# Patient Record
Sex: MALE | Race: WHITE | NOT HISPANIC OR LATINO | Employment: OTHER | URBAN - METROPOLITAN AREA
[De-identification: names, ages, dates, MRNs, and addresses within clinical notes are randomized per-mention and may not be internally consistent; named-entity substitution may affect disease eponyms.]

---

## 2017-10-20 ENCOUNTER — ALLSCRIPTS OFFICE VISIT (OUTPATIENT)
Dept: OTHER | Facility: OTHER | Age: 72
End: 2017-10-20

## 2017-10-20 ENCOUNTER — LAB REQUISITION (OUTPATIENT)
Dept: LAB | Facility: HOSPITAL | Age: 72
End: 2017-10-20
Payer: MEDICARE

## 2017-10-20 DIAGNOSIS — R31.9 HEMATURIA: ICD-10-CM

## 2017-10-20 LAB
BILIRUB UR QL STRIP: NORMAL
CLARITY UR: NORMAL
COLOR UR: YELLOW
GLUCOSE (HISTORICAL): NORMAL
HGB UR QL STRIP.AUTO: 50
KETONES UR STRIP-MCNC: NORMAL MG/DL
LEUKOCYTE ESTERASE UR QL STRIP: NORMAL
NITRITE UR QL STRIP: NORMAL
PH UR STRIP.AUTO: 6 [PH]
PROSTATE SPECIFIC ANTIGEN (HISTORICAL): 3.6 NG/ML (ref 0–4)
PROT UR STRIP-MCNC: NORMAL MG/DL
SP GR UR STRIP.AUTO: 1.02
UROBILINOGEN UR QL STRIP.AUTO: NORMAL

## 2017-10-20 PROCEDURE — 87086 URINE CULTURE/COLONY COUNT: CPT | Performed by: FAMILY MEDICINE

## 2017-10-21 LAB — BACTERIA UR CULT: NORMAL

## 2017-12-05 ENCOUNTER — ALLSCRIPTS OFFICE VISIT (OUTPATIENT)
Dept: OTHER | Facility: OTHER | Age: 72
End: 2017-12-05

## 2018-01-10 NOTE — PROGRESS NOTES
Assessment    1  Encounter for removal of sutures (V58 32) (Z48 02)    Discussion/Summary    1  soaked wound in alcohol/peroxide/nNS  scab still intact and sutures not visible---soaked wound bed for 30 minutes  2  advised pt to soak wound a couple of times a day in peroxide and apply bacitracin to make wound softer  3  rerun Monday for suture removal completion  Chief Complaint  Stitch removal left shin      History of Present Illness  HPI: 63yo M presents for suture removal of left shin  Had stitches put in 13 days ago  No discharge/redness  No pain  Review of Systems    Constitutional: no fever or chills, feels well, no tiredness, no recent weight loss or weight gain  Integumentary: skin wound  Active Problems    1  Hypercholesteremia (272 0) (E78 0)   2  Seborrheic keratosis (702 19) (L82 1)    Past Medical History    1  History of Encounter for prostate cancer screening (V76 44) (Z12 5)   2  History of Need for prophylactic vaccination and inoculation against influenza (V04 81)   (Z23)   3  History of Need for vaccination with 13-polyvalent pneumococcal conjugate vaccine   (V03 82) (Z23)   4  History of Screening for cardiovascular condition (V81 2) (Z13 6)   5  History of Screening for diabetes mellitus (V77 1) (Z13 1)    Family History    1  Family history of Non-Hodgkin lymphoma    Social History    · Lives with spouse   ·    · Never smoker   · Retired    Current Meds   1  No Reported Medications Recorded    The medication list was reviewed and updated today  Allergies    1  No Known Drug Allergies    Vitals   Recorded: [id number] 01:17PM   Temperature 96 5 F   Heart Rate 51   Respiration 18   Systolic 903   Diastolic 74   Height 5 ft 10 in   Weight 190 lb    BMI Calculated 27 26   BSA Calculated 2 04   O2 Saturation 99     Physical Exam    Constitutional   General appearance: No acute distress, well appearing and well nourished      Skin   Skin and subcutaneous tissue: Abnormal  Examination of the skin for lesions: Abnormal   A 2 cm laceration was noted left knee described as scab intact, sutures not fully visible  Procedure    Procedure: The wound was located on the left knee  The wound was 2 cm in length  Wound Exam: large thick scab There was mild erythema around the wound edges  Procedure Note: 5 sutures were removed  Post-Procedure: Complications: not all sutures removed  Follow-up in the office in 3 day(s)        Future Appointments    Date/Time Provider Specialty Site   01/25/2016 10:00 AM Luz Marina [facility]     Signatures   Electronically signed by : Cande FridayMAUREEN; Jan 22 2016  2:14PM EST                       (Author)    Electronically signed by : FROILAN Vitale ; Jan 23 2016  1:43PM EST                       (Co-author)

## 2018-01-13 VITALS
HEART RATE: 67 BPM | SYSTOLIC BLOOD PRESSURE: 140 MMHG | OXYGEN SATURATION: 98 % | BODY MASS INDEX: 26.92 KG/M2 | HEIGHT: 70 IN | RESPIRATION RATE: 16 BRPM | WEIGHT: 188 LBS | DIASTOLIC BLOOD PRESSURE: 90 MMHG

## 2018-01-13 NOTE — PROGRESS NOTES
Assessment    1  Depression screen (V79 0) (Z13 89)   2  Hypercholesteremia (272 0) (E78 00)    Plan  Encounter for screening colonoscopy    · 3 - Audra Clifton MD, [facility]  (Gastroenterology) Physician Referral  Consult  Status: Hold For -  Scheduling  Requested for: [id number]  are Referring to a non- Preferred Provider : Established Patient  Care Summary provided  : Yes  Hypercholesteremia    · (1) COMPREHENSIVE METABOLIC PANEL; Status:Need Information - Billable Problem; Requested for:[id number];    · (1) LIPID PANEL, FASTING; Status:Need Information - Billable Problem; Requested  for:[id number];     Lipid panel ad CMP ordered  Pt given referral for f/u colonoscopy with Dr Audra Clifton  Yearly exam suggested     Chief Complaint  patient here for CPE for medicare      History of Present Illness  HPI: No new complaints  Pt requests yearly lipid panel  Pt had brief episode of lightheadedness in the summer after playing tennis and riding his bike  No episodes since that time  Pt was possibly dehydrated  Welcome to [employer] and Wellness Visits: The patient is being seen for the subsequent annual wellness visit  Medicare Screening and Risk Factors   Hospitalizations: he has been previously hospitalizied and he has been hospitalized 3 times  Once per lifetime medicare screening tests: ECG has not been done and AAA screening US has not yet been done  Medicare Screening Tests Risk Questions   Abdominal aortic aneurysm risk assessment: over 72years of age  Osteoporosis risk assessment:  and alcohol use  HIV risk assessment: none indicated  Drug and Alcohol Use: The patient has never smoked cigarettes  The patient reports occasional alcohol use  Alcohol concern:   The patient has no concerns about alcohol abuse  He has never used illicit drugs  Diet and Physical Activity: Current diet includes well balanced meals, 1 cups of coffee per day and 1 cups of tea per day  He exercises 7 days a week times per week  Exercise: walking, ski, manually cut and splits firewood, cuts grass, tennis, 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 1 ) Little interest or pleasure in doing things? Not at all    2 ) Feeling down, depressed or hopeless? Not at all    3 ) Trouble falling asleep or sleeping too much? Not at all    4 ) Feeling tired or having little energy? Not at all    5 ) Poor appetite or overeating? Not at all    6 ) Feeling bad about yourself, or that you are a failure, or have let yourself or your family down? Not at all    7 ) Trouble concentrating on things, such as reading a newspaper or watching television? Not at all    8 ) Moving or speaking so slowly that other people could have noticed, or the opposite, moving or speaking faster than usual? Not at all  TOTAL SCORE: 0    How difficult have these problems made it for you to do your work, take care of things at home, or get along with people? Not at all  Depression screening  negative for symptoms  He denies feeling down, depressed, or hopeless over the past two weeks  He denies feeling little interest or pleasure in doing things over the past two weeks  Cognitive impairment screening: denies difficulty learning/retaining new information, denies difficulty handling complex tasks, denies difficulty with reasoning, denies difficulty with spatial ability and orientation, denies difficulty with language and denies difficulty with behavior  Functional Ability/Level of Safety: Hearing is normal bilaterally, normal in the right ear, normal in the left ear and a hearing aid is not used  He denies hearing difficulties   The patient is currently able to do activities of daily living without limitations, able to do instrumental activities of daily living without limitations, able to participate in social activities without limitations and able to drive without limitations  Activities of daily living details: does not need help using the phone, no transportation help needed, does not need help shopping, no meal preparation help needed, does not need help doing housework, does not need help doing laundry, does not need help managing medications and does not need help managing money  Fall risk factors:  alcohol use, but The patient fell 0 times in the past 12 months , no polypharmacy, no mobility impairment, no antidepressant use, no deconditioning, no postural hypotension, no sedative use, no visual impairment, no urinary incontinence, no antihypertensive use, no cognitive impairment, up and go test was normal and no previous fall  Home safety risk factors:  no grab bars in the bathroom and no handrails on the stairs, but no unfamiliar surroundings, no loose rugs, no poor household lighting, no uneven floors and no household clutter  Advance Directives: Advance directives: Pt will bring copy of livin will to office  Concerns with the patient's end of life decisions: Pt will bring copy to office  Co-Managers and Medical Equipment/Suppliers: See Patient Care Team   Preventive Quality Program 65 and Older: Falls Risk: The patient fell 0 times in the past 12 months  The patient is currently asymptomatic Symptoms Include: no confusion, no lightheadedness, no vertigo, no dizziness, no syncope, no impaired balance, no visual problems, no leg weakness, no recurring falls and no recent fall  Associated symptoms:  No associated symptoms are reported  The patient currently has no urinary incontinence symptoms  Active Problems    1  Encounter for removal of sutures (V58 32) (Z48 02)   2  Encounter for screening colonoscopy (V76 51) (Z12 11)   3  Hypercholesteremia (272 0) (E78 00)   4   Seborrheic keratosis (702 19) (L82 1)    Past Medical History    · History of Encounter for prostate cancer screening (V76 44) (Z12 5)   · History of Need for prophylactic vaccination and inoculation against influenza (V04 81)  (Z23)   · History of Need for vaccination with 13-polyvalent pneumococcal conjugate vaccine  (V03 82) (Z23)   · History of Screening for cardiovascular condition (V81 2) (Z13 6)   · History of Screening for diabetes mellitus (V77 1) (Z13 1)    Family History  Son    · Family history of Non-Hodgkin lymphoma    Social History    · Lives with spouse   ·    · Never smoker   · Retired    Current Meds   1  No Reported Medications Recorded    Allergies    1  No Known Drug Allergies    Immunizations   1    Influenza  10-Nov-2015    PCV  10-Nov-2015     Vitals  Signs    Temperature: 97 3 F  Heart Rate: 58  Respiration: 16  Systolic: 439, LUE, Sitting  Diastolic: 78, LUE, Sitting  Height: 5 ft 10 in  Weight: 185 lb   BMI Calculated: 26 54  BSA Calculated: 2 02  O2 Saturation: 98  Pain Scale: 0    Physical Exam    Constitutional   General appearance: Abnormal   appears healthy, overweight and appears younger than stated age  Pulmonary   Respiratory effort: No increased work of breathing or signs of respiratory distress  Auscultation of lungs: Clear to auscultation  Cardiovascular   Auscultation of heart: Normal rate and rhythm, normal S1 and S2, no murmurs  Examination of extremities for edema and/or varicosities: Normal     Musculoskeletal   Gait and station: Normal     Inspection/palpation of digits and nails: Normal without clubbing or cyanosis  Psychiatric   Judgment and insight: Normal     Orientation to person, place and time: Normal     Recent and remote memory: Intact  Mood and affect: Normal        Results/Data  PHQ-2 Adult Depression Screening [id number] 10:44AM User, Ahs     Test Name Result Flag Reference   PHQ-2 Adult Depression Score 0     Over the last two weeks, how often have you been bothered by any of the following problems?   Little interest or pleasure in doing things: Not at all - 0  Feeling down, depressed, or hopeless: Not at all - 0   PHQ-2 Adult Depression Screening Negative         Signatures   Electronically signed by : JOEL Dennis ; Dec  4 2016  8:10PM EST                       (Author)

## 2018-01-23 NOTE — PROGRESS NOTES
Assessment    1  Encounter for Medicare annual wellness exam (V70 0) (Z00 00)    Plan  Pt will bring back completed Advance Directive form to review and then copy into his chart  History of Present Illness  Welcome to Medicare and Wellness Visits: The patient is being seen for the subsequent annual wellness visit  Medicare Screening and Risk Factors   Medicare Screening Tests Risk Questions   Abdominal aortic aneurysm risk assessment: over 72years of age  Osteoporosis risk assessment:  and over 48years of age  HIV risk assessment: none indicated  Drug and Alcohol Use: The patient has never smoked cigarettes and has never used smokeless tobacco  The patient reports occasional alcohol use  Alcohol concern:   The patient has no concerns about alcohol abuse  He has never used illicit drugs  Diet and Physical Activity: Current diet includes well balanced meals  He exercises daily  Exercise: walking  Mood Disorder and Cognitive Impairment Screening: PHQ-9 Depression Scale   Cognitive impairment screening: denies difficulty learning/retaining new information, denies difficulty handling complex tasks, denies difficulty with reasoning, denies difficulty with spatial ability and orientation and denies difficulty with language  Functional Ability/Level of Safety: Hearing is normal bilaterally  He does not use a hearing aid  Advance Directives: Advance directives: Pt will fill out copy of advance directives and bring back to the office  Co-Managers and Medical Equipment/Suppliers: See Patient Care Team   Preventive Quality Program 65 and Older: The patient is currently asymptomatic Symptoms Include: The patient currently has no urinary incontinence symptoms  Review of Systems    Over the past 2 weeks, how often have you been bothered by the following problems? 1 ) Little interest or pleasure in doing things? Not at all    2 ) Feeling down, depressed or hopeless?  Not at all    3 ) Trouble falling asleep or sleeping too much? Not at all    4 ) Feeling tired or having little energy? Not at all    5 ) Poor appetite or overeating? Not at all    6 ) Feeling bad about yourself, or that you are a failure, or have let yourself or your family down? Not at all    7 ) Trouble concentrating on things, such as reading a newspaper or watching television? Not at all    8 ) Moving or speaking so slowly that other people could have noticed, or the opposite, moving or speaking faster than usual? Not at all  How difficult have these problems made it for you to do your work, take care of things at home, or get along with people? Not at all  Score 0      Active Problems    1  Blood in urine (599 70) (R31 9)   2  Depression screen (V79 0) (Z13 89)   3  Encounter for prostate cancer screening (V76 44) (Z12 5)   4  Encounter for removal of sutures (V58 32) (Z48 02)   5  Encounter for screening colonoscopy (V76 51) (Z12 11)   6  Hypercholesteremia (272 0) (E78 00)   7  Painless hematuria (599 70) (R31 9)   8  Refused influenza vaccine (V64 06) (Z28 21)   9  Seborrheic keratosis (702 19) (L82 1)    Past Medical History    · History of Encounter for prostate cancer screening (V76 44) (Z12 5)   · History of Need for prophylactic vaccination and inoculation against influenza (V04 81)  (Z23)   · History of Need for vaccination with 13-polyvalent pneumococcal conjugate vaccine  (V03 82) (Z23)   · History of Screening for cardiovascular condition (V81 2) (Z13 6)   · History of Screening for diabetes mellitus (V77 1) (Z13 1)    Family History  Son    · Family history of Non-Hodgkin lymphoma    Social History    · Lives with spouse   ·    · Never smoker   · Retired    Current Meds   1  No Reported Medications Recorded    Allergies    1   No Known Drug Allergies    Immunizations   1    Influenza  10-Nov-2015    PCV  10-Nov-2015     Signatures   Electronically signed by : JOEL Betancur ; Dec  8 2017  2:35PM EST (Author)

## 2019-05-13 ENCOUNTER — OFFICE VISIT (OUTPATIENT)
Dept: FAMILY MEDICINE CLINIC | Facility: CLINIC | Age: 74
End: 2019-05-13
Payer: MEDICARE

## 2019-05-13 VITALS
DIASTOLIC BLOOD PRESSURE: 80 MMHG | RESPIRATION RATE: 18 BRPM | OXYGEN SATURATION: 96 % | WEIGHT: 190 LBS | HEART RATE: 64 BPM | HEIGHT: 70 IN | BODY MASS INDEX: 27.2 KG/M2 | SYSTOLIC BLOOD PRESSURE: 144 MMHG

## 2019-05-13 DIAGNOSIS — Z13.6 SCREENING FOR CARDIOVASCULAR CONDITION: ICD-10-CM

## 2019-05-13 DIAGNOSIS — E78.2 MIXED HYPERLIPIDEMIA: ICD-10-CM

## 2019-05-13 DIAGNOSIS — Z23 PNEUMOCOCCAL VACCINATION GIVEN: ICD-10-CM

## 2019-05-13 DIAGNOSIS — Z00.00 MEDICARE ANNUAL WELLNESS VISIT, SUBSEQUENT: Primary | ICD-10-CM

## 2019-05-13 DIAGNOSIS — Z00.00 ENCOUNTER FOR MEDICARE ANNUAL WELLNESS EXAM: ICD-10-CM

## 2019-05-13 PROCEDURE — G0009 ADMIN PNEUMOCOCCAL VACCINE: HCPCS | Performed by: FAMILY MEDICINE

## 2019-05-13 PROCEDURE — 90732 PPSV23 VACC 2 YRS+ SUBQ/IM: CPT | Performed by: FAMILY MEDICINE

## 2019-05-13 PROCEDURE — G0439 PPPS, SUBSEQ VISIT: HCPCS | Performed by: FAMILY MEDICINE

## 2019-05-16 LAB
CHOLEST SERPL-MCNC: 210 MG/DL (ref 100–199)
HDLC SERPL-MCNC: 58 MG/DL
LABCORP COMMENT: NORMAL
LDLC SERPL CALC-MCNC: 138 MG/DL (ref 0–99)
SL AMB VLDL CHOLESTEROL CALC: 14 MG/DL (ref 5–40)
TRIGL SERPL-MCNC: 70 MG/DL (ref 0–149)

## 2020-09-14 ENCOUNTER — HOSPITAL ENCOUNTER (INPATIENT)
Facility: HOSPITAL | Age: 75
LOS: 1 days | Discharge: HOME | DRG: 072 | End: 2020-09-16
Attending: EMERGENCY MEDICINE | Admitting: INTERNAL MEDICINE
Payer: MEDICARE

## 2020-09-14 ENCOUNTER — APPOINTMENT (EMERGENCY)
Dept: CT IMAGING | Facility: HOSPITAL | Age: 75
DRG: 072 | End: 2020-09-14
Payer: MEDICARE

## 2020-09-14 DIAGNOSIS — I10 ESSENTIAL HYPERTENSION: ICD-10-CM

## 2020-09-14 DIAGNOSIS — E04.1 THYROID NODULE: ICD-10-CM

## 2020-09-14 DIAGNOSIS — G45.4 AMNESIA, GLOBAL, TRANSIENT: ICD-10-CM

## 2020-09-14 DIAGNOSIS — R40.4 TRANSIENT ALTERATION OF AWARENESS: Primary | ICD-10-CM

## 2020-09-14 DIAGNOSIS — E78.49 OTHER HYPERLIPIDEMIA: ICD-10-CM

## 2020-09-14 LAB
ALBUMIN SERPL BCP-MCNC: 3.7 G/DL (ref 3.5–5)
ALP SERPL-CCNC: 65 U/L (ref 46–116)
ALT SERPL W P-5'-P-CCNC: 24 U/L (ref 12–78)
ANION GAP SERPL CALCULATED.3IONS-SCNC: 10 MMOL/L (ref 4–13)
AST SERPL W P-5'-P-CCNC: 26 U/L (ref 5–45)
BASOPHILS # BLD AUTO: 0.02 THOUSANDS/ΜL (ref 0–0.1)
BASOPHILS NFR BLD AUTO: 0 % (ref 0–1)
BILIRUB SERPL-MCNC: 0.41 MG/DL (ref 0.2–1)
BUN SERPL-MCNC: 19 MG/DL (ref 5–25)
CALCIUM SERPL-MCNC: 9 MG/DL (ref 8.3–10.1)
CHLORIDE SERPL-SCNC: 105 MMOL/L (ref 100–108)
CO2 SERPL-SCNC: 24 MMOL/L (ref 21–32)
CREAT SERPL-MCNC: 0.75 MG/DL (ref 0.6–1.3)
EOSINOPHIL # BLD AUTO: 0.02 THOUSAND/ΜL (ref 0–0.61)
EOSINOPHIL NFR BLD AUTO: 0 % (ref 0–6)
ERYTHROCYTE [DISTWIDTH] IN BLOOD BY AUTOMATED COUNT: 13.2 % (ref 11.6–15.1)
GFR SERPL CREATININE-BSD FRML MDRD: 88 ML/MIN/1.73SQ M
GLUCOSE SERPL-MCNC: 113 MG/DL (ref 65–140)
HCT VFR BLD AUTO: 39.7 % (ref 36.5–49.3)
HGB BLD-MCNC: 13.1 G/DL (ref 12–17)
IMM GRANULOCYTES # BLD AUTO: 0.03 THOUSAND/UL (ref 0–0.2)
IMM GRANULOCYTES NFR BLD AUTO: 0 % (ref 0–2)
LYMPHOCYTES # BLD AUTO: 0.8 THOUSANDS/ΜL (ref 0.6–4.47)
LYMPHOCYTES NFR BLD AUTO: 10 % (ref 14–44)
MCH RBC QN AUTO: 30 PG (ref 26.8–34.3)
MCHC RBC AUTO-ENTMCNC: 33 G/DL (ref 31.4–37.4)
MCV RBC AUTO: 91 FL (ref 82–98)
MONOCYTES # BLD AUTO: 0.65 THOUSAND/ΜL (ref 0.17–1.22)
MONOCYTES NFR BLD AUTO: 8 % (ref 4–12)
NEUTROPHILS # BLD AUTO: 6.33 THOUSANDS/ΜL (ref 1.85–7.62)
NEUTS SEG NFR BLD AUTO: 82 % (ref 43–75)
NRBC BLD AUTO-RTO: 0 /100 WBCS
PLATELET # BLD AUTO: 217 THOUSANDS/UL (ref 149–390)
PMV BLD AUTO: 9.4 FL (ref 8.9–12.7)
POTASSIUM SERPL-SCNC: 4.1 MMOL/L (ref 3.5–5.3)
PROT SERPL-MCNC: 6.7 G/DL (ref 6.4–8.2)
RBC # BLD AUTO: 4.36 MILLION/UL (ref 3.88–5.62)
SODIUM SERPL-SCNC: 139 MMOL/L (ref 136–145)
TROPONIN I SERPL-MCNC: 0.02 NG/ML
WBC # BLD AUTO: 7.85 THOUSAND/UL (ref 4.31–10.16)

## 2020-09-14 PROCEDURE — 85025 COMPLETE CBC W/AUTO DIFF WBC: CPT | Performed by: CHIROPRACTOR

## 2020-09-14 PROCEDURE — 84484 ASSAY OF TROPONIN QUANT: CPT | Performed by: CHIROPRACTOR

## 2020-09-14 PROCEDURE — 99285 EMERGENCY DEPT VISIT HI MDM: CPT

## 2020-09-14 PROCEDURE — 99285 EMERGENCY DEPT VISIT HI MDM: CPT | Performed by: EMERGENCY MEDICINE

## 2020-09-14 PROCEDURE — 93005 ELECTROCARDIOGRAM TRACING: CPT

## 2020-09-14 PROCEDURE — 1124F ACP DISCUSS-NO DSCNMKR DOCD: CPT | Performed by: EMERGENCY MEDICINE

## 2020-09-14 PROCEDURE — 70450 CT HEAD/BRAIN W/O DYE: CPT

## 2020-09-14 PROCEDURE — 36415 COLL VENOUS BLD VENIPUNCTURE: CPT | Performed by: CHIROPRACTOR

## 2020-09-14 PROCEDURE — 80053 COMPREHEN METABOLIC PANEL: CPT | Performed by: CHIROPRACTOR

## 2020-09-14 PROCEDURE — G1004 CDSM NDSC: HCPCS

## 2020-09-15 ENCOUNTER — APPOINTMENT (OUTPATIENT)
Dept: ULTRASOUND IMAGING | Facility: HOSPITAL | Age: 75
DRG: 072 | End: 2020-09-15
Payer: MEDICARE

## 2020-09-15 ENCOUNTER — APPOINTMENT (INPATIENT)
Dept: MRI IMAGING | Facility: HOSPITAL | Age: 75
DRG: 072 | End: 2020-09-15
Payer: MEDICARE

## 2020-09-15 ENCOUNTER — APPOINTMENT (OUTPATIENT)
Dept: CT IMAGING | Facility: HOSPITAL | Age: 75
DRG: 072 | End: 2020-09-15
Payer: MEDICARE

## 2020-09-15 PROBLEM — G45.4 AMNESIA, GLOBAL, TRANSIENT: Status: ACTIVE | Noted: 2020-09-15

## 2020-09-15 PROBLEM — E78.5 HLD (HYPERLIPIDEMIA): Status: ACTIVE | Noted: 2020-09-15

## 2020-09-15 LAB
ANION GAP SERPL CALCULATED.3IONS-SCNC: 9 MMOL/L (ref 4–13)
ATRIAL RATE: 71 BPM
BILIRUB UR QL STRIP: NEGATIVE
BUN SERPL-MCNC: 15 MG/DL (ref 5–25)
CALCIUM SERPL-MCNC: 8.6 MG/DL (ref 8.3–10.1)
CHLORIDE SERPL-SCNC: 107 MMOL/L (ref 100–108)
CHOLEST SERPL-MCNC: 178 MG/DL (ref 50–200)
CLARITY UR: CLEAR
CO2 SERPL-SCNC: 24 MMOL/L (ref 21–32)
COLOR UR: YELLOW
CREAT SERPL-MCNC: 0.57 MG/DL (ref 0.6–1.3)
ERYTHROCYTE [DISTWIDTH] IN BLOOD BY AUTOMATED COUNT: 13.4 % (ref 11.6–15.1)
EST. AVERAGE GLUCOSE BLD GHB EST-MCNC: 114 MG/DL
GFR SERPL CREATININE-BSD FRML MDRD: 99 ML/MIN/1.73SQ M
GLUCOSE P FAST SERPL-MCNC: 89 MG/DL (ref 65–99)
GLUCOSE SERPL-MCNC: 89 MG/DL (ref 65–140)
GLUCOSE UR STRIP-MCNC: NEGATIVE MG/DL
HBA1C MFR BLD: 5.6 %
HCT VFR BLD AUTO: 37.9 % (ref 36.5–49.3)
HDLC SERPL-MCNC: 52 MG/DL
HGB BLD-MCNC: 12.9 G/DL (ref 12–17)
HGB UR QL STRIP.AUTO: NEGATIVE
KETONES UR STRIP-MCNC: NEGATIVE MG/DL
LDLC SERPL CALC-MCNC: 120 MG/DL (ref 0–100)
LEUKOCYTE ESTERASE UR QL STRIP: NEGATIVE
MCH RBC QN AUTO: 31.3 PG (ref 26.8–34.3)
MCHC RBC AUTO-ENTMCNC: 34 G/DL (ref 31.4–37.4)
MCV RBC AUTO: 92 FL (ref 82–98)
NITRITE UR QL STRIP: NEGATIVE
P AXIS: 59 DEGREES
PH UR STRIP.AUTO: 6.5 [PH]
PLATELET # BLD AUTO: 194 THOUSANDS/UL (ref 149–390)
PLATELET # BLD AUTO: 206 THOUSANDS/UL (ref 149–390)
PMV BLD AUTO: 9.4 FL (ref 8.9–12.7)
PMV BLD AUTO: 9.6 FL (ref 8.9–12.7)
POTASSIUM SERPL-SCNC: 3.8 MMOL/L (ref 3.5–5.3)
PR INTERVAL: 168 MS
PROT UR STRIP-MCNC: NEGATIVE MG/DL
QRS AXIS: -1 DEGREES
QRSD INTERVAL: 86 MS
QT INTERVAL: 384 MS
QTC INTERVAL: 409 MS
RBC # BLD AUTO: 4.12 MILLION/UL (ref 3.88–5.62)
SODIUM SERPL-SCNC: 140 MMOL/L (ref 136–145)
SP GR UR STRIP.AUTO: 1.02 (ref 1–1.03)
T WAVE AXIS: 41 DEGREES
TRIGL SERPL-MCNC: 31 MG/DL
UROBILINOGEN UR QL STRIP.AUTO: 0.2 E.U./DL
VENTRICULAR RATE: 68 BPM
WBC # BLD AUTO: 6.26 THOUSAND/UL (ref 4.31–10.16)

## 2020-09-15 PROCEDURE — 85027 COMPLETE CBC AUTOMATED: CPT | Performed by: PHYSICIAN ASSISTANT

## 2020-09-15 PROCEDURE — 81003 URINALYSIS AUTO W/O SCOPE: CPT | Performed by: PHYSICIAN ASSISTANT

## 2020-09-15 PROCEDURE — 85049 AUTOMATED PLATELET COUNT: CPT | Performed by: PHYSICIAN ASSISTANT

## 2020-09-15 PROCEDURE — 99221 1ST HOSP IP/OBS SF/LOW 40: CPT | Performed by: INTERNAL MEDICINE

## 2020-09-15 PROCEDURE — 70498 CT ANGIOGRAPHY NECK: CPT

## 2020-09-15 PROCEDURE — 36415 COLL VENOUS BLD VENIPUNCTURE: CPT | Performed by: PHYSICIAN ASSISTANT

## 2020-09-15 PROCEDURE — 99223 1ST HOSP IP/OBS HIGH 75: CPT | Performed by: PSYCHIATRY & NEUROLOGY

## 2020-09-15 PROCEDURE — 70496 CT ANGIOGRAPHY HEAD: CPT

## 2020-09-15 PROCEDURE — G1004 CDSM NDSC: HCPCS

## 2020-09-15 PROCEDURE — 93880 EXTRACRANIAL BILAT STUDY: CPT | Performed by: SURGERY

## 2020-09-15 PROCEDURE — 93010 ELECTROCARDIOGRAM REPORT: CPT | Performed by: INTERNAL MEDICINE

## 2020-09-15 PROCEDURE — 80048 BASIC METABOLIC PNL TOTAL CA: CPT | Performed by: PHYSICIAN ASSISTANT

## 2020-09-15 PROCEDURE — 80061 LIPID PANEL: CPT | Performed by: PHYSICIAN ASSISTANT

## 2020-09-15 PROCEDURE — 93880 EXTRACRANIAL BILAT STUDY: CPT

## 2020-09-15 PROCEDURE — 70551 MRI BRAIN STEM W/O DYE: CPT

## 2020-09-15 PROCEDURE — 83036 HEMOGLOBIN GLYCOSYLATED A1C: CPT | Performed by: PHYSICIAN ASSISTANT

## 2020-09-15 RX ORDER — ACETAMINOPHEN 325 MG/1
650 TABLET ORAL EVERY 6 HOURS PRN
Status: DISCONTINUED | OUTPATIENT
Start: 2020-09-15 | End: 2020-09-16 | Stop reason: HOSPADM

## 2020-09-15 RX ORDER — AMLODIPINE BESYLATE 5 MG/1
5 TABLET ORAL DAILY
Status: DISCONTINUED | OUTPATIENT
Start: 2020-09-16 | End: 2020-09-16 | Stop reason: HOSPADM

## 2020-09-15 RX ORDER — ONDANSETRON 2 MG/ML
4 INJECTION INTRAMUSCULAR; INTRAVENOUS EVERY 6 HOURS PRN
Status: DISCONTINUED | OUTPATIENT
Start: 2020-09-15 | End: 2020-09-16 | Stop reason: HOSPADM

## 2020-09-15 RX ORDER — ATORVASTATIN CALCIUM 40 MG/1
80 TABLET, FILM COATED ORAL
Status: DISCONTINUED | OUTPATIENT
Start: 2020-09-15 | End: 2020-09-16 | Stop reason: HOSPADM

## 2020-09-15 RX ORDER — ASPIRIN 81 MG/1
81 TABLET, CHEWABLE ORAL DAILY
Status: DISCONTINUED | OUTPATIENT
Start: 2020-09-15 | End: 2020-09-16 | Stop reason: HOSPADM

## 2020-09-15 RX ADMIN — IOHEXOL 85 ML: 350 INJECTION, SOLUTION INTRAVENOUS at 12:35

## 2020-09-15 RX ADMIN — ATORVASTATIN CALCIUM 80 MG: 40 TABLET, FILM COATED ORAL at 16:42

## 2020-09-15 RX ADMIN — THIAMINE HYDROCHLORIDE 100 MG: 100 INJECTION, SOLUTION INTRAMUSCULAR; INTRAVENOUS at 01:58

## 2020-09-15 NOTE — ASSESSMENT & PLAN NOTE
· Appearing to be back to baseline, however this has never happened to him before  · Neuro exam benign, CT head without acute findings  · Neuro checks  · Neurology evaluation  · Obtain MRI brain  · IV thiamine now  · Repeat lab work in the morning

## 2020-09-15 NOTE — CONSULTS
Consultation - Neurology   Javier Abad 66 y o  male MRN: [id number]  Unit/Bed#: ED 26 Encounter: [id number]      Assessment/Plan   * Amnesia, global, transient  Assessment & Plan  Javier Abad is a 66 y o  male with no significant past medical history who presents to [facility] ED on 09/14/2020 with a transient episode of confusion  Strong suspicion for transient global amnesia  Will obtain MRI brain to rule out acute infarct, specifically in hippocampus  Etiology for TGA are typically unknown  Will obtain CTA head and neck given findings of a right bruit on exam   Will also obtain routine EEG, however lower suspicion for seizures given unremarkable seizure history  If routine EEG is not completed in the timely manner, would recommend outpatient study  Plan:   -MRI brain pending  -CTA head and neck pending   -Routine EEG pending  -Pending:  Vitamin B12, hemoglobin A1c  -Continue ASA 81 mg daily   -Continue atorvastatin 40 mg daily   -Frequent neuro checks  -Medical management per primary team   -Continue supportive care per primary team, notify with changes    Imaging/Labs:  -CT head 09/14/2020:  · No acute intracranial abnormalities noted    -Lipid panel:  Cholesterol 178, triglycerides 31, HDL 52, LDL elevated 120  -Labs WNL:  Troponin, UA    HLD (hyperlipidemia)  Assessment & Plan  Continue statin therapy    Recommendations for outpatient neurological follow up have yet to be determined  History of Present Illness     Reason for Consult / Principal Problem: Transient alteration of awareness   Hx and PE limited by: Unable to recall events   HPI: Javier Abad is a 66 y o   male with no significant past medical history who presents to [facility] ED on 09/14/2020 with a transient episode of confusion  Per chart review, patient reports he had an exam to obtain his  license the morning of 09/14/2020    Patient reports that he drove to the airport, completed the exam, and drove home, however does not recall any of these events  Patient states that the last thing he remembers is waking up in the morning  Patient reports he woke up the morning of 09/14/2020 in his normal state of health  He recalls driving to [place] and flying to [facility] for his  license re-certification  Patient reports the last thing he remembers is speaking to the instructor in [facility]  Patient assumes he flew back to [place] and drove home, however does not recall this event  Patient is not sure if he drove himself to the hospital or if his wife drive him  Patient's wife reports that patient called her asking to pick him up  Wife instructed the patient to go look for his motorcycle, which he found and drove back home  Patient's wife and then called the  who reported that the patient did perfect on his re-certification exam, however states that somebody else that saw him that day reports that he was acting unusual   Patient denies recalling any focal weakness, sensory deficits, headaches, visual changes  Denies seizure history, family history of seizure, recent head trauma, previous CNS infections, episodes of urinary/fecal incontinence, tongue bites  Patient's wife does admit to significant stress that the patient has been under, which he typically has prior to his certification exams  Patient presented to [facility] ED on 09/14/2020 with an elevated /87  CT head unremarkable for acute intracranial abnormalities  Lab work unremarkable as well  In the ED patient reported he was back to his baseline  Today patient states he feels fine and admits to being at his baseline  Denies CP, SOB, headache, dizziness, vision changes, N/V, abdominal pain, weakness or numbness  Inpatient consult to Neurology  Consult performed by: Shawn Marrero PA-C  Consult ordered by: Luis F Casiano PA-C        Review of Systems  12 point ROS performed, as stated above, all others negative  Historical Information   History reviewed  No pertinent past medical history  Past Surgical History:   Procedure Laterality Date    HERNIA REPAIR      THYROIDECTOMY Left      Social History   Social History     Substance and Sexual Activity   Alcohol Use Never    Frequency: Never     Social History     Substance and Sexual Activity   Drug Use Never     E-Cigarette/Vaping     E-Cigarette/Vaping Substances     Social History     Tobacco Use   Smoking Status Never Smoker   Smokeless Tobacco Never Used     Family History:   Family History   Problem Relation Age of Onset    Lymphoma Son         Non-Hodgkin's lymphoma        Review of previous medical records was completed  Meds/Allergies   all current active meds have been reviewed, current meds:   Current Facility-Administered Medications   Medication Dose Route Frequency    acetaminophen (TYLENOL) tablet 650 mg  650 mg Oral Q6H PRN    [START ON 9/16/2020] amLODIPine (NORVASC) tablet 5 mg  5 mg Oral Daily    aspirin chewable tablet 81 mg  81 mg Oral Daily    atorvastatin (LIPITOR) tablet 80 mg  80 mg Oral Daily With Dinner    enoxaparin (LOVENOX) subcutaneous injection 40 mg  40 mg Subcutaneous Daily    ondansetron (ZOFRAN) injection 4 mg  4 mg Intravenous Q6H PRN   , PTA meds:   None    and     No Known Allergies    Objective   Vitals:Blood pressure (!) 171/81, pulse 60, temperature 98 2 °F (36 8 °C), temperature source Bladder, resp  rate 16, SpO2 96 %  ,There is no height or weight on file to calculate BMI  Intake/Output Summary (Last 24 hours) at 9/15/2020 1224  Last data filed at 9/15/2020 0303  Gross per 24 hour   Intake 50 ml   Output --   Net 50 ml       Invasive Devices: Invasive Devices     Peripheral Intravenous Line            Peripheral IV 09/14/20 Right Antecubital less than 1 day                Physical Exam  Vitals signs and nursing note reviewed  Constitutional:       General: He is not in acute distress  Appearance: Normal appearance  He is normal weight   He is not ill-appearing, toxic-appearing or diaphoretic  HENT:      Head: Normocephalic and atraumatic  Eyes:      General: No scleral icterus  Right eye: No discharge  Left eye: No discharge  Extraocular Movements: Extraocular movements intact  Conjunctiva/sclera: Conjunctivae normal       Pupils: Pupils are equal, round, and reactive to light  Neck:      Musculoskeletal: Normal range of motion and neck supple  Comments: Right bruit on exam  Cardiovascular:      Rate and Rhythm: Normal rate and regular rhythm  Pulmonary:      Effort: Pulmonary effort is normal  No respiratory distress  Musculoskeletal: Normal range of motion  Skin:     General: Skin is warm and dry  Coloration: Skin is not jaundiced or pale  Findings: No bruising, erythema, lesion or rash  Neurological:      Mental Status: He is alert  Coordination: Finger-Nose-Finger Test normal       Deep Tendon Reflexes: Strength normal    Psychiatric:         Mood and Affect: Mood normal          Behavior: Behavior normal          Thought Content: Thought content normal          Judgment: Judgment normal        Neurologic Exam     Mental Status   Patient is alert, lying in bed  oreinted x 3  No dysarthria or aphasia noted  Able to name the president and current events  Able to name all objects provided  Able to state the day of the week and recites the days of the week backwards  Registers 3/3; able to recall 2/3 after 5 minutes; able to recall the last object when give choices  Cranial Nerves     CN II   Visual fields full to confrontation  CN III, IV, VI   Pupils are equal, round, and reactive to light  Nystagmus: none   Upgaze: normal  Downgaze: normal  Conjugate gaze: present    CN V   Facial sensation intact  CN VII   Facial expression full, symmetric       CN VIII   Hearing: intact    CN XI   CN XI normal      CN XII   Tongue deviation: none  No tongue lacerations noted     Motor Exam   Muscle bulk: normal  Overall muscle tone: normal  Right arm pronator drift: absent  Left arm pronator drift: absent    Strength   Strength 5/5 throughout  Sensory Exam   Light touch normal    Right leg vibration: decreased from ankle  Left leg vibration: decreased from toes  Pinprick normal      Gait, Coordination, and Reflexes     Coordination   Finger to nose coordination: normal    Tremor   Resting tremor: absent    Reflexes   Right plantar: normal  Left plantar: normal  Right ankle clonus: absent  Left ankle clonus: absent  No ataxia or dysmetria in bilateral FTN testing   Bilateral upper extremity reflexes 1 throughout   Bilateral patellar reflexes 1 +  Bilateral achilles reflexes trace      Lab Results: I have personally reviewed pertinent reports    Recent Results (from the past 24 hour(s))   ECG 12 lead    Collection Time: 09/14/20 10:30 PM   Result Value Ref Range    Ventricular Rate 68 BPM    Atrial Rate 71 BPM    OR Interval 168 ms    QRSD Interval 86 ms    QT Interval 384 ms    QTC Interval 409 ms    P [place] 59 degrees    QRS Axis -1 degrees    T Wave Axis 41 degrees   Troponin I    Collection Time: 09/14/20 10:42 PM   Result Value Ref Range    Troponin I 0 02 <=0 04 ng/mL   CBC and differential    Collection Time: 09/14/20 10:42 PM   Result Value Ref Range    WBC 7 85 4 31 - 10 16 Thousand/uL    RBC 4 36 3 88 - 5 62 Million/uL    Hemoglobin 13 1 12 0 - 17 0 g/dL    Hematocrit 39 7 36 5 - 49 3 %    MCV 91 82 - 98 fL    MCH 30 0 26 8 - 34 3 pg    MCHC 33 0 31 4 - 37 4 g/dL    RDW 13 2 11 6 - 15 1 %    MPV 9 4 8 9 - 12 7 fL    Platelets 370 311 - 464 Thousands/uL    nRBC 0 /100 WBCs    Neutrophils Relative 82 (H) 43 - 75 %    Immat GRANS % 0 0 - 2 %    Lymphocytes Relative 10 (L) 14 - 44 %    Monocytes Relative 8 4 - 12 %    Eosinophils Relative 0 0 - 6 %    Basophils Relative 0 0 - 1 %    Neutrophils Absolute 6 33 1 85 - 7 62 Thousands/µL    Immature Grans Absolute 0 03 0 00 - 0 20 Thousand/uL    Lymphocytes Absolute 0 80 0 60 - 4 47 Thousands/µL    Monocytes Absolute 0 65 0 17 - 1 22 Thousand/µL    Eosinophils Absolute 0 02 0 00 - 0 61 Thousand/µL    Basophils Absolute 0 02 0 00 - 0 10 Thousands/µL   Comprehensive metabolic panel    Collection Time: 09/14/20 10:42 PM   Result Value Ref Range    Sodium 139 136 - 145 mmol/L    Potassium 4 1 3 5 - 5 3 mmol/L    Chloride 105 100 - 108 mmol/L    CO2 24 21 - 32 mmol/L    ANION GAP 10 4 - 13 mmol/L    BUN 19 5 - 25 mg/dL    Creatinine 0 75 0 60 - 1 30 mg/dL    Glucose 113 65 - 140 mg/dL    Calcium 9 0 8 3 - 10 1 mg/dL    AST 26 5 - 45 U/L    ALT 24 12 - 78 U/L    Alkaline Phosphatase 65 46 - 116 U/L    Total Protein 6 7 6 4 - 8 2 g/dL    Albumin 3 7 3 5 - 5 0 g/dL    Total Bilirubin 0 41 0 20 - 1 00 mg/dL    eGFR 88 ml/min/1 73sq m   UA (URINE) with reflex to Scope    Collection Time: 09/15/20 12:41 AM   Result Value Ref Range    Color, UA Yellow     Clarity, UA Clear     Specific Gravity, UA 1 025 1 003 - 1 030    pH, UA 6 5 4 5, 5 0, 5 5, 6 0, 6 5, 7 0, 7 5, 8 0    Leukocytes, UA Negative Negative    Nitrite, UA Negative Negative    Protein, UA Negative Negative mg/dl    Glucose, UA Negative Negative mg/dl    Ketones, UA Negative Negative mg/dl    Urobilinogen, UA 0 2 0 2, 1 0 E U /dl E U /dl    Bilirubin, UA Negative Negative    Blood, UA Negative Negative   Platelet count    Collection Time: 09/15/20 12:50 AM   Result Value Ref Range    Platelets 379 773 - 955 Thousands/uL    MPV 9 4 8 9 - 12 7 fL   Hemoglobin A1C w/ EAG Estimation    Collection Time: 09/15/20  5:57 AM   Result Value Ref Range    Hemoglobin A1C 5 6 Normal 3 8-5 6%; PreDiabetic 5 7-6 4%;  Diabetic >=6 5%; Glycemic control for adults with diabetes <7 0% %     mg/dl   Lipid Panel with Direct LDL reflex    Collection Time: 09/15/20  5:57 AM   Result Value Ref Range    Cholesterol 178 50 - 200 mg/dL    Triglycerides 31 <=150 mg/dL    HDL, Direct 52 >=40 mg/dL    LDL Calculated 120 (H) 0 - 100 mg/dL   CBC and Platelet    Collection Time: 09/15/20  5:57 AM   Result Value Ref Range    WBC 6 26 4 31 - 10 16 Thousand/uL    RBC 4 12 3 88 - 5 62 Million/uL    Hemoglobin 12 9 12 0 - 17 0 g/dL    Hematocrit 37 9 36 5 - 49 3 %    MCV 92 82 - 98 fL    MCH 31 3 26 8 - 34 3 pg    MCHC 34 0 31 4 - 37 4 g/dL    RDW 13 4 11 6 - 15 1 %    Platelets 932 895 - 877 Thousands/uL    MPV 9 6 8 9 - 12 7 fL   Basic metabolic panel    Collection Time: 09/15/20  5:57 AM   Result Value Ref Range    Sodium 140 136 - 145 mmol/L    Potassium 3 8 3 5 - 5 3 mmol/L    Chloride 107 100 - 108 mmol/L    CO2 24 21 - 32 mmol/L    ANION GAP 9 4 - 13 mmol/L    BUN 15 5 - 25 mg/dL    Creatinine 0 57 (L) 0 60 - 1 30 mg/dL    Glucose 89 65 - 140 mg/dL    Glucose, Fasting 89 65 - 99 mg/dL    Calcium 8 6 8 3 - 10 1 mg/dL    eGFR 99 ml/min/1 73sq m   ]  Imaging Studies: I have personally reviewed pertinent reports and I have personally reviewed pertinent films in PACS  EKG, Pathology, and Other Studies: I have personally reviewed pertinent reports      VTE Prophylaxis: Enoxaparin (Lovenox)

## 2020-09-15 NOTE — ASSESSMENT & PLAN NOTE
Юлия Andrade is a 66 y o  male with no significant past medical history who presents to [facility] ED on 09/14/2020 with a transient episode of confusion  Strong suspicion for transient global amnesia  MRI brain unremarkable for acute infarct  Etiology of TGA remains unclear  CTA head and neck and carotid ultrasound unremarkable for significant disease of the carotids  Will also obtain routine EEG, however lower suspicion for seizures given unremarkable seizure history  Okay to obtain routine EEG in outpatient setting of not definitive prior to discharge  Plan:   -Able to complete routine EEG in the outpatient setting of not completed prior to discharge  -Pending:  Vitamin B12  -Continue ASA 81 mg daily   -Continue atorvastatin 40 mg daily   -Frequent neuro checks  -Medical management per primary team   -Continue supportive care per primary team, notify with changes  -The patient should follow-up with outpatient neurology  Communication has been sent to the office to schedule a follow-up appointment  Imaging/Labs:  -CT head 09/14/2020:  · No acute intracranial abnormalities noted  -CTA head and neck 09/15/2020:  · CT brain stable chronic microangiopathic change with no mass, hemorrhage, or acute ischemia identified  · Atherosclerotic disease of the aortic arch with mild narrowing of the left subclavian and moderate stenosis of the right subclavian at the origins  · Common carotid artery bifurcation and proximal cervical internal carotid arteries are unremarkable  · Mild intracranial atherosclerotic disease involving the left posterior cerebral artery which arises from the internal carotid artery  No occlusion or thrombosis noted  · Incidental exophytic nodule for which nonemergent thyroid ultrasound is recommended  -VAS carotid complete study:  · < 50% stenosis in bilateral ICAs  Plaque is homogeneous and smooth  -MRI brain 09/15/2020:  · White matter changes suggestive of chronic microangiopathy  · No acute intracranial pathology noted    No evidence of recent infarct    -Lipid panel:  Cholesterol 178, triglycerides 31, HDL 52, LDL elevated 120  -Hemoglobin A1c:  5 6  -Labs WNL:  Troponin, UA

## 2020-09-15 NOTE — H&P
[address] - Internal Medicine Service  H&P- Coleman Harrison 1945, 66 y o  male MRN: [id number]  Unit/Bed#: ED 26 Encounter: [id number]  Primary Care Provider: He Gill DO   Date and time admitted to hospital: 9/14/2020  9:55 PM    * Amnesia, global, transient  Assessment & Plan  · Appearing to be back to baseline, however this has never happened to him before  · Neuro exam benign, CT head without acute findings  · Neuro checks  · Neurology evaluation  · Obtain MRI brain  · IV thiamine now  · Repeat lab work in the morning  VTE Prophylaxis: Enoxaparin (Lovenox)  / reason for no mechanical VTE prophylaxis ambulatory   Code Status: full code   POLST: POLST form is not discussed and not completed at this time  Discussion with family: patient    Anticipated Length of Stay:  Patient will be admitted on an Observation basis with an anticipated length of stay of < 2 midnights  Justification for Hospital Stay: amnesic episode    Total Time for Visit, including Counseling / Coordination of Care: 1 hour  Greater than 50% of this total time spent on direct patient counseling and coordination of care  Chief Complaint:   amnesia    History of Present Illness:    Coleman Harrison is a 66 y o  male who presents with reports of amnesia for today  Patient's wife is at bedside also providing history  The patient had a test for his  license today, reportedly drove to an airport, completed his exam, and then drove home  The patient reports to his wife then that he does not remember any of this, and only remembered waking up this morning  His wife urged him to come to the emergency department for further evaluation  Upon exam the patient is completely back to baseline at this time, however cannot recall any events of the day  Denied any difficulty with speech, facial droop, numbness, tingling, weakness, falls, trauma  This has never happened to him before    Denies any fevers, chills, neck pain, headaches, lightheadedness, dizziness  Reports he is in very good health, is not currently on any medications  He has appropriate health surveillance and follows up with his PCP as directed  Review of Systems:    Review of Systems   Constitutional: Negative for activity change, appetite change, chills, fatigue and fever  HENT: Negative for congestion, rhinorrhea, sinus pressure and sore throat  Eyes: Negative for photophobia, pain and visual disturbance  Respiratory: Negative for cough, shortness of breath and wheezing  Cardiovascular: Negative for chest pain, palpitations and leg swelling  Gastrointestinal: Negative for abdominal distention, abdominal pain, constipation, diarrhea, nausea and vomiting  Endocrine: Negative for cold intolerance, heat intolerance, polydipsia and polyuria  Genitourinary: Negative for difficulty urinating, dysuria, flank pain, frequency and hematuria  Musculoskeletal: Negative for arthralgias, back pain and joint swelling  Skin: Negative for color change, pallor and rash  Allergic/Immunologic: Negative  Neurological: Negative for dizziness, syncope, weakness, light-headedness and headaches  Amnesia   Hematological: Negative  Psychiatric/Behavioral: Negative  Past Medical and Surgical History:     History reviewed  No pertinent past medical history  Past Surgical History:   Procedure Laterality Date    HERNIA REPAIR      THYROIDECTOMY Left        Meds/Allergies:    Prior to Admission medications    Not on File     I have reviewed home medications with patient personally      Allergies: No Known Allergies    Social History:     Marital Status: /Civil Union   Occupation: non-contributory  Patient Pre-hospital Living Situation: home  Patient Pre-hospital Level of Mobility: full  Patient Pre-hospital Diet Restrictions: none  Substance Use History:   Social History     Substance and Sexual Activity   Alcohol Use Never  Frequency: Never     Social History     Tobacco Use   Smoking Status Never Smoker   Smokeless Tobacco Never Used     Social History     Substance and Sexual Activity   Drug Use Never       Family History:    Family History   Problem Relation Age of Onset    Lymphoma Son         Non-Hodgkin's lymphoma        Physical Exam:     Vitals:   Blood Pressure: 165/80 (09/15/20 0045)  Pulse: 62 (09/15/20 0045)  Temperature: 98 9 °F (37 2 °C) (09/14/20 2147)  Temp Source: Oral (09/14/20 2147)  Respirations: 17 (09/15/20 0045)  SpO2: 96 % (09/15/20 0045)    Physical Exam  Vitals signs and nursing note reviewed  Constitutional:       General: He is not in acute distress  Appearance: Normal appearance  HENT:      Head: Normocephalic and atraumatic  Mouth/Throat:      Mouth: Mucous membranes are moist    Eyes:      General: No scleral icterus  Extraocular Movements: Extraocular movements intact  Pupils: Pupils are equal, round, and reactive to light  Neck:      Musculoskeletal: Neck supple  Cardiovascular:      Rate and Rhythm: Normal rate and regular rhythm  Heart sounds: Normal heart sounds  No murmur  No friction rub  Pulmonary:      Effort: Pulmonary effort is normal       Breath sounds: Normal breath sounds  No wheezing or rhonchi  Abdominal:      General: Bowel sounds are normal  There is no distension  Palpations: Abdomen is soft  Tenderness: There is no abdominal tenderness  There is no guarding  Musculoskeletal:         General: No swelling  Right lower leg: No edema  Left lower leg: No edema  Skin:     General: Skin is warm and dry  Capillary Refill: Capillary refill takes less than 2 seconds  Coloration: Skin is not jaundiced or pale  Neurological:      General: No focal deficit present  Mental Status: He is alert and oriented to person, place, and time  Mental status is at baseline  Cranial Nerves: No cranial nerve deficit  Sensory: No sensory deficit  Motor: No weakness  Gait: Gait normal          Additional Data:     Lab Results: I have personally reviewed pertinent reports  Results from last 7 days   Lab Units 09/14/20  2242   WBC Thousand/uL 7 85   HEMOGLOBIN g/dL 13 1   HEMATOCRIT % 39 7   PLATELETS Thousands/uL 217   NEUTROS PCT % 82*   LYMPHS PCT % 10*   MONOS PCT % 8   EOS PCT % 0     Results from last 7 days   Lab Units 09/14/20  2242   SODIUM mmol/L 139   POTASSIUM mmol/L 4 1   CHLORIDE mmol/L 105   CO2 mmol/L 24   BUN mg/dL 19   CREATININE mg/dL 0 75   ANION GAP mmol/L 10   CALCIUM mg/dL 9 0   ALBUMIN g/dL 3 7   TOTAL BILIRUBIN mg/dL 0 41   ALK PHOS U/L 65   ALT U/L 24   AST U/L 26   GLUCOSE RANDOM mg/dL 113                       Imaging: I have personally reviewed pertinent reports  CT head without contrast   Final Result by Sanjay Hoover MD (09/14 2319)      No acute intracranial abnormality  Workstation performed: [id number]         MRI inpatient order    (Results Pending)       EKG, Pathology, and Other Studies Reviewed on Admission:   · Prior pertinent studies and records reviewed in [facility]  Allscripts / [facility] Records Reviewed: Yes     ** Please Note: This note has been constructed using a voice recognition system   **

## 2020-09-15 NOTE — ED ATTENDING ATTESTATION
9/14/2020  ILevy MD, saw and evaluated the patient  I have discussed the patient with the resident/non-physician practitioner and agree with the resident's/non-physician practitioner's findings, Plan of Care, and MDM as documented in the resident's/non-physician practitioner's note, except where noted  All available labs and Radiology studies were reviewed  I was present for key portions of any procedure(s) performed by the resident/non-physician practitioner and I was immediately available to provide assistance  At this point I agree with the current assessment done in the Emergency Department  I have conducted an independent evaluation of this patient a history and physical is as follows:    ED Course         Critical Care Time  Procedures    This patient was overseen by the medical resident  Patient is exam unremarkable but present today shin consistent with transient global amnesia  Workup unremarkable including EKG labs at head CT  Patient will be admitted for further observation consultation by neurology

## 2020-09-15 NOTE — UTILIZATION REVIEW
Initial Clinical Review - admitted as Observation on 9/14/20 @ 2345  Changed to Inpatient on 9/15/20 @ 02 73 [phone]  Ordered      Inpatient Admission  Once      Transfer Service: Hospitalist       Question  Answer  Comment    Admitting Physician  SINCERE Queen     Level of Care  Med Surg     Estimated length of stay  More than 2 Midnights     Certification  I certify that inpatient services are medically necessary for this patient for a duration of greater than two midnights  See H&P and MD Progress Notes for additional information about the patient's course of treatment  09/15/20 1645           Admission: Date/Time/Statement:   Admission Orders (From admission, onward)     Ordered        09/14/20 2345  Place in Observation  Once                   Orders Placed This Encounter   Procedures    Place in Observation     Standing Status:   Standing     Number of Occurrences:   1     Order Specific Question:   Admitting Physician     Answer:   Mazin Morales     Order Specific Question:   Level of Care     Answer:   Med Surg [16]     ED Arrival Information     Expected Arrival Acuity Means of Arrival Escorted By Service Admission Type    - 9/14/2020 21:42 Urgent Walk-In Spouse Hospitalist Urgent    Arrival Complaint    CONFUSION        Chief Complaint   Patient presents with    Altered Mental Status     Pt presents to ER from home with wife who brought pt in for AMS - went and flew his plane today (verified that it was without issue) and then pt began being confused thinking his motorcycle was flown to a different airport  Pt has no recollection of flying his plane today but is now alert and oriented x4 with no complaints except for the memory loss  HPI:   Aubrey Panda is a 66 y o  male who presents with reports of amnesia for today  Patient's wife is at bedside also providing history    The patient had a test for his  license today, reportedly drove to an airport, completed his exam, and then drove home  The patient reports to his wife then that he does not remember any of this, and only remembered waking up this morning  His wife urged him to come to the emergency department for further evaluation  Upon exam the patient is completely back to baseline at this time, however cannot recall any events of the day  Denied any difficulty with speech, facial droop, numbness, tingling, weakness, falls, trauma  This has never happened to him before  Denies any fevers, chills, neck pain, headaches, lightheadedness, dizziness  Reports he is in very good health, is not currently on any medications  He has appropriate health surveillance and follows up with his PCP as directed  Attestation signed by Aimee Dunn MD at 9/15/2020 9:43 AM      I have seen and examined patient this morning  Agree with the note from the AP  Patient does mention an episode of amnesia yesterday  This was noted mostly by his wife, he does not recollect what happened in a period of about 2 hours yesterday afternoon  He never has similar episode of amnesia likely is that he can recollect  He denies any previous history of stroke or dementia  He denies any weakness or loss of consciousness  Although he denies any previous medical history he did have hypertension when arriving here with an SBP as high as 180s  He also seems of hyperlipidemia with an LDL of over 130  Telemetry reveals sinus rhythm, brief episodes of sinus bradycardia during sleep     Physical exam:     Patient currently AAOx4,PERRLA  No jaundice noticed  Normal work of breathing, breathing sounds present bilaterally no crackles rhonchi or wheezing appreciated  RRR,Normal S1-S2, no S3, no JVD, no rubs, murmurs, or gallops, no carotid bruit  Normal strength 5/5 in upper and lower extremities:  DTR 2+, sensory exam normal     Assessment:     1  Episode of transient global amnesia, rule out cerebrovascular accident/TIA stroke  2  Hyperlipidemia  3  Hypertension     Plan:  - Continue neurological checks periodically  - Continue observation status for now on telemetry monitoring for the next 24 hours  - I will start patient on atorvastatin starting tonight given his lipid profile and presentation   - I will start patient on aspirin as well  - Vitamin B12 lvl has been ordered, waiting for results  - I will tolerate permissive hypertension for now, starting tomorrow willstart patient on amlodipine 5mg daily   - Wait for MRI of the brain   - Neurology has been consulted, appreciate input   - Discussed with the wife [phone],RTH has been updated in detail          * Amnesia, global, transient  Assessment & Plan  · Appearing to be back to baseline, however this has never happened to him before  · Neuro exam benign, CT head without acute findings  · Neuro checks  · Neurology evaluation  · Obtain MRI brain  · IV thiamine now  · Repeat lab work in the morning  VTE Prophylaxis: Enoxaparin (Lovenox)  / reason for no mechanical VTE prophylaxis ambulatory   Code Status: full code   POLST: POLST form is not discussed and not completed at this time  Discussion with family: patient     Anticipated Length of Stay:  Patient will be admitted on an Observation basis with an anticipated length of stay of < 2 midnights  Justification for Hospital Stay: amnesic episode       9/15 Neurology consult:      Attestation signed by Elena Chambers MD at 9/15/2020 2:38 PM       Split/Shared Statement (No Critical Care Time)     I saw/examined the patient  I agree with the Advanced Practitioner's note with the following additions/exceptions:     The patient seen at bedside in emergency department with advanced practitioner, is a 49-year-old gentleman who presents with an episode of amnesia consistent with TGA  His neurological examination is unremarkable, higher functions are preserved, no evidence of any cranial nerve, motor or sensory deficits    No evidence of any dysmetria was noted, patient has evidence of a bruit in the right carotid, his workup is in progress await MRI, CTA, EEG and patient started on aspirin as well as atorvastatin  Will follow up this patient with you  Detailed assessment and plan is as outlined below      Teto Fontana MD      Assessment/Plan   * Amnesia, global, transient  Assessment & Plan  Nichol Weber is a 66 y o  male with no significant past medical history who presents to [facility] ED on 09/14/2020 with a transient episode of confusion      Strong suspicion for transient global amnesia  Will obtain MRI brain to rule out acute infarct, specifically in hippocampus  Etiology for TGA are typically unknown  Will obtain CTA head and neck given findings of a right bruit on exam   Will also obtain routine EEG, however lower suspicion for seizures given unremarkable seizure history  If routine EEG is not completed in the timely manner, would recommend outpatient study       Plan:   -MRI brain pending  -CTA head and neck pending   -Routine EEG pending  -Pending:  Vitamin B12, hemoglobin A1c  -Continue ASA 81 mg daily   -Continue atorvastatin 40 mg daily   -Frequent neuro checks  -Medical management per primary team   -Continue supportive care per primary team, notify with changes     Imaging/Labs:  -CT head 09/14/2020:  ? No acute intracranial abnormalities noted     -Lipid panel:  Cholesterol 178, triglycerides 31, HDL 52, LDL elevated 120  -Labs WNL:  Troponin, UA     HLD (hyperlipidemia)  Assessment & Plan  Continue statin therapy    Recommendations for outpatient neurological follow up have yet to be determined                ED Triage Vitals   Temperature Pulse Respirations Blood Pressure SpO2   09/14/20 2147 09/14/20 2147 09/14/20 2147 09/14/20 2147 09/14/20 2147   98 9 °F (37 2 °C) 94 18 (!) 187/87 98 %      Temp Source Heart Rate Source Patient Position - Orthostatic VS BP Location FiO2 (%)   09/14/20 2147 09/14/20 2147 09/14/20 2147 09/14/20 2147 --   Oral Monitor Sitting Left arm       Pain Score       09/15/20 0559       No Pain          Wt Readings from Last 1 Encounters:   05/13/19 86 2 kg (190 lb)     Additional Vital Signs:     Date/Time   Temp   Pulse   Resp   BP   MAP (mmHg)   SpO2   O2 Device   Patient Position - Orthostatic VS    09/15/20 0900   98 2 °F (36 8 °C)   60   16   171/81Abnormal     --   96 %   None (Room air)   --    09/15/20 0559   --   57   16   146/71   --   98 %   None (Room air)   Lying    09/15/20 0045   --   62   17   165/80   115   96 %   None (Room air)   Lying      Date and Time  Eye Opening  Best Verbal Response  Best Motor Response  Emilia Coma Scale Score    09/15/20 0645  4  5  6  15    09/15/20 0545  4  5  6  15    09/15/20 0445  4  5  6  15    09/15/20 0345  4  5  6  15    09/15/20 0245  4  5  6  15    09/15/20 0145  4  5  6  15    09/15/20 0045  4  5  6  15          Pertinent Labs/Diagnostic Test Results:     9/15 CTA head and neck: CT brain: Stable chronic microangiopathic change with no mass, hemorrhage or acute ischemia identified  CT angiography: There is atherosclerotic disease of the aortic arch with mild narrowing of the left subclavian and moderate stenosis of the right subclavian at their origins  Common carotid artery bifurcations and proximal cervical internal carotid arteries are unremarkable  Mild intracranial atherosclerotic disease involving the left posterior cerebral artery which arises from the internal carotid artery  No occlusion or thrombosis  9/14 CT head:  No acute intracranial abnormality        Results from last 7 days   Lab Units 09/15/20  0557 09/15/20  0050 09/14/20  2242   WBC Thousand/uL 6 26  --  7 85   HEMOGLOBIN g/dL 12 9  --  13 1   HEMATOCRIT % 37 9  --  39 7   PLATELETS Thousands/uL 194 206 217   NEUTROS ABS Thousands/µL  --   --  6 33         Results from last 7 days   Lab Units 09/15/20  0557 09/14/20  2242   SODIUM mmol/L 140 139   POTASSIUM mmol/L 3 8 4 1   CHLORIDE mmol/L 107 105   CO2 mmol/L 24 24   ANION GAP mmol/L 9 10   BUN mg/dL 15 19   CREATININE mg/dL 0 57* 0 75   EGFR ml/min/1 73sq m 99 88   CALCIUM mg/dL 8 6 9 0     Results from last 7 days   Lab Units 09/14/20  2242   AST U/L 26   ALT U/L 24   ALK PHOS U/L 65   TOTAL PROTEIN g/dL 6 7   ALBUMIN g/dL 3 7   TOTAL BILIRUBIN mg/dL 0 41         Results from last 7 days   Lab Units 09/15/20  0557 09/14/20  2242   GLUCOSE RANDOM mg/dL 89 113         Results from last 7 days   Lab Units 09/15/20  0557   HEMOGLOBIN A1C % 5 6   EAG mg/dl 114     No results found for: BETA-HYDROXYBUTYRATE                   Results from last 7 days   Lab Units 09/14/20  2242   TROPONIN I ng/mL 0 02                                                         Results from last 7 days   Lab Units 09/15/20  0041   CLARITY UA  Clear   COLOR UA  Yellow   SPEC GRAV UA  1 025   PH UA  6 5   GLUCOSE UA mg/dl Negative   KETONES UA mg/dl Negative   BLOOD UA  Negative   PROTEIN UA mg/dl Negative   NITRITE UA  Negative   BILIRUBIN UA  Negative   UROBILINOGEN UA E U /dl 0 2   LEUKOCYTES UA  Negative                                               ED Treatment:   Medication Administration from 09/14/2020 2142 to 09/15/2020 0956       Date/Time Order Dose Route Action Action by Comments     09/15/2020 0303 thiamine (VITAMIN B1) 100 mg in sodium chloride 0 9 % 50 mL IVPB 0 mg Intravenous Stopped Deyanira Garay RN      09/15/2020 0158 thiamine (VITAMIN B1) 100 mg in sodium chloride 0 9 % 50 mL IVPB 100 mg Intravenous New Bag Deyanira Garay RN         History reviewed  No pertinent past medical history  Present on Admission:   Amnesia, global, transient      Admitting Diagnosis: Confusion [R41 0]  Age/Sex: 66 y o  male       Admission Orders: MRI brain, VAS carotid study, neuro checks         Scheduled Medications:  [START ON 9/16/2020] amLODIPine, 5 mg, Oral, Daily  aspirin, 81 mg, Oral, Daily  atorvastatin, 80 mg, Oral, Daily With Dinner  enoxaparin, 40 mg, Subcutaneous, Daily      Continuous IV Infusions: None  PRN Meds:  acetaminophen, 650 mg, Oral, Q6H PRN  ondansetron, 4 mg, Intravenous, Q6H PRN        IP CONSULT TO NEUROLOGY    Network Utilization Review Department  [email or web address]  ATTENTION: Please call with any questions or concerns to [phone] and carefully listen to the prompts so that you are directed to the right person  All voicemails are confidential   Dru Brody all requests for admission clinical reviews, approved or denied determinations and any other requests to dedicated fax number below belonging to the campus where the patient is receiving treatment   List of dedicated fax numbers for the Facilities:  [address] DENIALS (Administrative/Medical Necessity) [phone]   [address] [place] [address] (Maternity/NICU/Pediatrics) [phone]   [facility] [phone]   [facility] [phone]   [address] [place] [address] [phone]   [address] [place] [address]  [phone]   [address] [facility] [address] [facility] [phone]   [facility] [phone]   [address] [facility]  [address] [facility] [address] [facility] [phone]

## 2020-09-15 NOTE — ED NOTES
Patient ambulatory to the bathroom with a steady gait  No signs of distress noted  Patient denies pain       Landon Bargeri, EMILY  09/15/20 0107

## 2020-09-15 NOTE — ED PROVIDER NOTES
History  Chief Complaint   Patient presents with    Altered Mental Status     Pt presents to ER from home with wife who brought pt in for AMS - went and flew his plane today (verified that it was without issue) and then pt began being confused thinking his motorcycle was flown to a different airport  Pt has no recollection of flying his plane today but is now alert and oriented x4 with no complaints except for the memory loss  This patient is is a 17-year-old male who presents to the ED this evening with his wife with chief complaints of a loss of memory for the bulk of the afternoon of today, as well as some confused thinking this afternoon is also reported by his wife  More specifically, the patient took a motorcycle ride to an air field, took a 's test including flying the plane, but has no recall of these events whatsoever  This is the 1st time the patient has ever experiencing anything like this  The patient is alert at the bedside and is oriented to place, month and year  When specifically questioned he denies headache, nausea, vomiting, dizziness, or vertigo  Denies any chest pain, shortness of breath or recent significant illness  Denies nausea, vomiting, diarrhea  Denies weakness  His wife was present for all the questions, contributed to the history was present during the physical examination  None       History reviewed  No pertinent past medical history  Past Surgical History:   Procedure Laterality Date    HERNIA REPAIR      THYROIDECTOMY Left        Family History   Problem Relation Age of Onset    Lymphoma Son         Non-Hodgkin's lymphoma      I have reviewed and agree with the history as documented      E-Cigarette/Vaping     E-Cigarette/Vaping Substances     Social History     Tobacco Use    Smoking status: Never Smoker    Smokeless tobacco: Never Used   Substance Use Topics    Alcohol use: Never     Frequency: Never    Drug use: Never        Review of Systems Constitutional: Negative for chills, fatigue and fever  Respiratory: Negative for cough, chest tightness and shortness of breath  Gastrointestinal: Negative for abdominal pain, diarrhea, nausea and vomiting  Genitourinary: Negative for dysuria  Skin: Negative  Neurological: Negative for dizziness  Physical Exam  ED Triage Vitals [09/14/20 2147]   Temperature Pulse Respirations Blood Pressure SpO2   98 9 °F (37 2 °C) 94 18 (!) 187/87 98 %      Temp Source Heart Rate Source Patient Position - Orthostatic VS BP Location FiO2 (%)   Oral Monitor Sitting Left arm --      Pain Score       --             Orthostatic Vital Signs  Vitals:    09/14/20 2147   BP: (!) 187/87   Pulse: 94   Patient Position - Orthostatic VS: Sitting       Physical Exam  HENT:      Head: Atraumatic  Eyes:      Pupils: Pupils are equal, round, and reactive to light  Cardiovascular:      Rate and Rhythm: Normal rate and regular rhythm  Heart sounds: No murmur  Pulmonary:      Effort: Pulmonary effort is normal  No respiratory distress  Breath sounds: No wheezing  Comments: Diminished breath sounds on the right  Abdominal:      General: There is no distension  Palpations: There is no mass  Tenderness: There is no abdominal tenderness  Skin:     General: Skin is warm and dry  Neurological:      Mental Status: He is alert and oriented to person, place, and time  Cranial Nerves: No cranial nerve deficit, dysarthria or facial asymmetry  Motor: No weakness  Coordination: Romberg sign negative  Coordination normal       Gait: Gait normal       Comments: Recall 2/3 objects  Psychiatric:         Mood and Affect: Mood normal          Speech: Speech normal          Behavior: Behavior normal          Cognition and Memory: Cognition is not impaired           ED Medications  Medications - No data to display    Diagnostic Studies  Results Reviewed     Procedure Component Value Units Date/Time Troponin I [[id number]]     Lab Status:  No result Specimen:  Blood     CBC and differential [[id number]]     Lab Status:  No result Specimen:  Blood     Comprehensive metabolic panel [[id number]]     Lab Status:  No result Specimen:  Blood     UA (URINE) with reflex to Scope [[id number]]     Lab Status:  No result Specimen:  Urine                  CT head without contrast    (Results Pending)         Procedures  Procedures      ED Course                                       MDM      Disposition  Final diagnoses:   None     ED Disposition     None      Follow-up Information    None         Patient's Medications    No medications on file     No discharge procedures on file  PDMP Review     None           ED Provider  Attending physically available and evaluated Agustinyasir Rodriguez  LISA managed the patient along with the ED Attending      Electronically Signed by         Kavita Tamayo MD  09/14/20 0912

## 2020-09-16 VITALS
HEART RATE: 54 BPM | TEMPERATURE: 97.7 F | DIASTOLIC BLOOD PRESSURE: 67 MMHG | RESPIRATION RATE: 18 BRPM | BODY MASS INDEX: 26.23 KG/M2 | OXYGEN SATURATION: 95 % | HEIGHT: 70 IN | SYSTOLIC BLOOD PRESSURE: 142 MMHG | WEIGHT: 183.2 LBS

## 2020-09-16 PROBLEM — I10 HYPERTENSION: Status: ACTIVE | Noted: 2020-09-16

## 2020-09-16 PROBLEM — E04.1 THYROID NODULE: Status: ACTIVE | Noted: 2020-09-16

## 2020-09-16 PROBLEM — G45.4 AMNESIA, GLOBAL, TRANSIENT: Status: RESOLVED | Noted: 2020-09-15 | Resolved: 2020-09-16

## 2020-09-16 LAB — VIT B12 SERPL-MCNC: 427 PG/ML (ref 100–900)

## 2020-09-16 PROCEDURE — 82607 VITAMIN B-12: CPT | Performed by: PHYSICIAN ASSISTANT

## 2020-09-16 PROCEDURE — 99238 HOSP IP/OBS DSCHRG MGMT 30/<: CPT | Performed by: INTERNAL MEDICINE

## 2020-09-16 PROCEDURE — 99232 SBSQ HOSP IP/OBS MODERATE 35: CPT | Performed by: PSYCHIATRY & NEUROLOGY

## 2020-09-16 RX ORDER — ATORVASTATIN CALCIUM 80 MG/1
80 TABLET, FILM COATED ORAL
Qty: 30 TABLET | Refills: 1 | Status: SHIPPED | OUTPATIENT
Start: 2020-09-16 | End: 2020-11-08 | Stop reason: SDUPTHER

## 2020-09-16 RX ORDER — AMLODIPINE BESYLATE 5 MG/1
5 TABLET ORAL DAILY
Qty: 30 TABLET | Refills: 1 | Status: SHIPPED | OUTPATIENT
Start: 2020-09-17 | End: 2020-11-08 | Stop reason: SDUPTHER

## 2020-09-16 RX ORDER — ASPIRIN 81 MG/1
81 TABLET, CHEWABLE ORAL DAILY
Qty: 30 TABLET | Refills: 1 | Status: SHIPPED | OUTPATIENT
Start: 2020-09-17 | End: 2021-11-02

## 2020-09-16 RX ADMIN — ASPIRIN 81 MG 81 MG: 81 TABLET ORAL at 08:46

## 2020-09-16 RX ADMIN — AMLODIPINE BESYLATE 5 MG: 5 TABLET ORAL at 08:46

## 2020-09-16 RX ADMIN — ENOXAPARIN SODIUM 40 MG: 40 INJECTION SUBCUTANEOUS at 08:46

## 2020-09-16 NOTE — PROGRESS NOTES
Progress Note - Neurology   Christine Frank 66 y o  male MRN: [id number]  Unit/Bed#: S -01 Encounter: [id number]      Assessment/Plan   * Amnesia, global, transient  Assessment & Plan  Christine Frank is a 66 y o  male with no significant past medical history who presents to [facility] ED on 09/14/2020 with a transient episode of confusion  Strong suspicion for transient global amnesia  MRI brain unremarkable for acute infarct  Etiology of TGA remains unclear  CTA head and neck and carotid ultrasound unremarkable for significant disease of the carotids  Will also obtain routine EEG, however lower suspicion for seizures given unremarkable seizure history  Okay to obtain routine EEG in outpatient setting of not completed prior to discharge  Plan:   -Able to complete routine EEG in the outpatient setting of not completed prior to discharge  -Pending:  Vitamin B12  -Continue ASA 81 mg daily   -Continue atorvastatin 40 mg daily   -Frequent neuro checks  -Medical management per primary team   -Continue supportive care per primary team, notify with changes  -The patient should follow-up with outpatient neurology  Communication has been sent to the office to schedule a follow-up appointment  Imaging/Labs:  -CT head 09/14/2020:  · No acute intracranial abnormalities noted  -CTA head and neck 09/15/2020:  · CT brain stable chronic microangiopathic change with no mass, hemorrhage, or acute ischemia identified  · Atherosclerotic disease of the aortic arch with mild narrowing of the left subclavian and moderate stenosis of the right subclavian at the origins  · Common carotid artery bifurcation and proximal cervical internal carotid arteries are unremarkable  · Mild intracranial atherosclerotic disease involving the left posterior cerebral artery which arises from the internal carotid artery    No occlusion or thrombosis noted  · Incidental exophytic nodule for which nonemergent thyroid ultrasound is recommended  -VAS carotid complete study:  · < 50% stenosis in bilateral ICAs  Plaque is homogeneous and smooth  -MRI brain 09/15/2020:  · White matter changes suggestive of chronic microangiopathy  · No acute intracranial pathology noted  No evidence of recent infarct    -Lipid panel:  Cholesterol 178, triglycerides 31, HDL 52, LDL elevated 120  -Hemoglobin A1c:  5 6  -Labs WNL:  Troponin, UA    HLD (hyperlipidemia)  Assessment & Plan  Continue statin therapy    Kole Coreas will need follow up in in 4 weeks with general attending or advance practitioner  He will require a routine EEG within 3 weeks  Subjective:   Blood pressure is improved, most recent 142/67 which patient reports is at his baseline  Patient states he has not had any further episodes of confusion or memory loss  Patient reports he is back to his baseline and ready to go home  Denies CP, SOB, headache, dizziness, vision changes, N/V, abdominal pain, weakness or numbness  ROS:  12 point ROS performed, as stated above, all others negative  Vitals: Blood pressure 142/67, pulse (!) 54, temperature 97 7 °F (36 5 °C), temperature source Oral, resp  rate 18, height 5' 10" (1 778 m), weight 83 1 kg (183 lb 3 2 oz), SpO2 95 %  ,Body mass index is 26 29 kg/m²  Physical Exam  Vitals signs and nursing note reviewed  Constitutional:       Appearance: Normal appearance  He is normal weight  HENT:      Head: Normocephalic and atraumatic  Eyes:      General: No scleral icterus  Right eye: No discharge  Left eye: No discharge  Extraocular Movements: Extraocular movements intact and EOM normal       Conjunctiva/sclera: Conjunctivae normal    Neck:      Musculoskeletal: Normal range of motion and neck supple  Pulmonary:      Effort: Pulmonary effort is normal  No respiratory distress  Musculoskeletal: Normal range of motion  Skin:     General: Skin is warm and dry  Coloration: Skin is not jaundiced or pale        Findings: No bruising, erythema, lesion or rash  Neurological:      Mental Status: He is alert  Psychiatric:         Mood and Affect: Mood normal          Behavior: Behavior normal          Thought Content: Thought content normal          Judgment: Judgment normal        Neurologic Exam     Mental Status   Patient is alert, sitting up in chair  Oriented x3  No dysarthria or aphasia noted  Able to name the president and names all objects provided  Follows multistep commands and answers all questions appropriately  Cranial Nerves     CN II   Visual fields full to confrontation  CN III, IV, VI   Extraocular motions are normal    Nystagmus: none   Upgaze: normal  Downgaze: normal  Conjugate gaze: present    CN V   Facial sensation intact  CN VII   Facial expression full, symmetric  CN VIII   Hearing: intact    CN XI   CN XI normal      CN XII   Tongue deviation: none  No tongue lacerations noted     Motor Exam   Muscle bulk: normal  Overall muscle tone: normal  Right arm pronator drift: absent  Left arm pronator drift: absent   strength symmetric, 5/5 bilaterally  Bilateral upper and lower extremity strength testing 5/5 throughout     Sensory Exam   Light touch normal      Gait, Coordination, and Reflexes     Tremor   Resting tremor: absent  No involuntary movements noted throughout exam  Able to stand up from chair without assistance and ambulates across the room  Gait WNL     Lab Results: I have personally reviewed pertinent reports  Recent Results (from the past 24 hour(s))   Vitamin B12    Collection Time: 09/16/20  6:16 AM   Result Value Ref Range    Vitamin B-12 427 100 - 900 pg/mL   ]  Imaging Studies: I have personally reviewed pertinent reports and I have personally reviewed pertinent films in PACS  EKG, Pathology, and Other Studies: I have personally reviewed pertinent reports  VTE Prophylaxis: Enoxaparin (Lovenox)     Counseling / Coordination of Care  Total time spent today 31 minutes  Greater than 50% of total time was spent with the patient and/or family counseling and/or coordination of care  A description of the counseling/coordination of care:  Patient was seen and evaluated  Discussed with attending  Chart reviewed thoroughly including laboratory and imaging studies  Plan of care discussed with patient and primary team   Discussed MRI brain, CTA head and neck, and carotid ultrasound results with the patient  Discussed plan for obtaining outpatient EEG and following up with Neurology

## 2020-09-16 NOTE — ASSESSMENT & PLAN NOTE
CTA showed Incidental exophytic thyroid nodule for which nonemergent thyroid ultrasound is recommended                  Will provide patient with the same

## 2020-09-16 NOTE — PLAN OF CARE
Problem: PAIN - ADULT  Goal: Verbalizes/displays adequate comfort level or baseline comfort level  Description: Interventions:  - Encourage patient to monitor pain and request assistance  - Assess pain using appropriate pain scale  - Administer analgesics based on type and severity of pain and evaluate response  - Implement non-pharmacological measures as appropriate and evaluate response  - Consider cultural and social influences on pain and pain management  - Notify physician/advanced practitioner if interventions unsuccessful or patient reports new pain  Outcome: Progressing     Problem: SAFETY ADULT  Goal: Patient will remain free of falls  Description: INTERVENTIONS:  - Assess patient frequently for physical needs  -  Identify cognitive and physical deficits and behaviors that affect risk of falls    -  [place] fall precautions as indicated by assessment   - Educate patient/family on patient safety including physical limitations  - Instruct patient to call for assistance with activity based on assessment  - Modify environment to reduce risk of injury  - Consider OT/PT consult to assist with strengthening/mobility  Outcome: Progressing  Goal: Maintain or return to baseline ADL function  Description: INTERVENTIONS:  -  Assess patient's ability to carry out ADLs; assess patient's baseline for ADL function and identify physical deficits which impact ability to perform ADLs (bathing, care of mouth/teeth, toileting, grooming, dressing, etc )  - Assess/evaluate cause of self-care deficits   - Assess range of motion  - Assess patient's mobility; develop plan if impaired  - Assess patient's need for assistive devices and provide as appropriate  - Encourage maximum independence but intervene and supervise when necessary  - Involve family in performance of ADLs  - Assess for home care needs following discharge   - Consider OT consult to assist with ADL evaluation and planning for discharge  - Provide patient education as appropriate  Outcome: Progressing  Goal: Maintain or return mobility status to optimal level  Description: INTERVENTIONS:  - Assess patient's baseline mobility status (ambulation, transfers, stairs, etc )    - Identify cognitive and physical deficits and behaviors that affect mobility  - Identify mobility aids required to assist with transfers and/or ambulation (gait belt, sit-to-stand, lift, walker, cane, etc )  - [place] fall precautions as indicated by assessment  - Record patient progress and toleration of activity level on Mobility SBAR; progress patient to next Phase/Stage  - Instruct patient to call for assistance with activity based on assessment  - Consider rehabilitation consult to assist with strengthening/weightbearing, etc   Outcome: Progressing     Problem: DISCHARGE PLANNING  Goal: Discharge to home or other facility with appropriate resources  Description: INTERVENTIONS:  - Identify barriers to discharge w/patient and caregiver  - Arrange for needed discharge resources and transportation as appropriate  - Identify discharge learning needs (meds, wound care, etc )  - Arrange for interpretive services to assist at discharge as needed  - Refer to Case Management Department for coordinating discharge planning if the patient needs post-hospital services based on physician/advanced practitioner order or complex needs related to functional status, cognitive ability, or social support system  Outcome: Progressing     Problem: Knowledge Deficit  Goal: Patient/family/caregiver demonstrates understanding of disease process, treatment plan, medications, and discharge instructions  Description: Complete learning assessment and assess knowledge base    Interventions:  - Provide teaching at level of understanding  - Provide teaching via preferred learning methods  Outcome: Progressing     Problem: NEUROSENSORY - ADULT  Goal: Achieves stable or improved neurological status  Description: INTERVENTIONS  - Monitor and report changes in neurological status  - Monitor vital signs such as temperature, blood pressure, glucose, and any other labs ordered   - Initiate measures to prevent increased intracranial pressure  - Monitor for seizure activity and implement precautions if appropriate      Outcome: Progressing  Goal: Remains free of injury related to seizures activity  Description: INTERVENTIONS  - Maintain airway, patient safety  and administer oxygen as ordered  - Monitor patient for seizure activity, document and report duration and description of seizure to physician/advanced practitioner  - If seizure occurs,  ensure patient safety during seizure  - Reorient patient post seizure  - Seizure pads on all 4 side rails  - Instruct patient/family to notify RN of any seizure activity including if an aura is experienced  - Instruct patient/family to call for assistance with activity based on nursing assessment  - Administer anti-seizure medications if ordered    Outcome: Progressing  Goal: Achieves maximal functionality and self care  Description: INTERVENTIONS  - Monitor swallowing and airway patency with patient fatigue and changes in neurological status  - Encourage and assist patient to increase activity and self care     - Encourage visually impaired, hearing impaired and aphasic patients to use assistive/communication devices  Outcome: Progressing

## 2020-09-16 NOTE — DISCHARGE INSTR - AVS FIRST PAGE
Dear Kole Coreas,     It was our pleasure to care for you here at [facility]  It is our hope that we were always able to exceed the expected standards for your care during your stay  You were hospitalized due to transient global amnesia  You were cared for on the 3rd floor under the service of Scott Conklin MD with the [facility] who covers for your primary care physician (PCP), Jean-Paul Birmingham DO, while you were hospitalized  If you have any questions or concerns related to this hospitalization, you may contact us at [phone]  For follow up as well as medication refills, we recommend that you follow up with your primary care physician  A registered nurse will reach out to you by phone within a few days after your discharge to answer any additional questions that you may have after going home  However, at this time we provide for you here, the most important instructions / recommendations at discharge:     · Notable Medication Adjustments -   · You have been started on aspirin 81 mg once a day, atorvastatin 80 mg once at night, amlodipine 5 mg once a day  · Testing Required after Discharge -   · Thyroid ultrasound  · EEG  · Important follow up information -   · Follow-up with your primary care doctor Dr Jean-Paul Birmingham  · Follow-up with neurologist from [facility] neurology  · Please review this entire after visit summary as additional general instructions including medication list, appointments, activity, diet, any pertinent wound care, and other additional recommendations from your care team that may be provided for you        Sincerely,     Scott Conklin MD

## 2020-09-16 NOTE — INCIDENTAL FINDINGS
The following findings require follow up:  Radiographic finding   Finding: Incidental exophytic thyroid nodule for which nonemergent thyroid ultrasound is recommendedonCTA   Follow up required: Patient will need to do a thyroid US   Follow up should be done within  week(s)    Please notify the following clinician to assist with the follow up:   Dr Maria Del Rosario Salcedo

## 2020-09-16 NOTE — INCIDENTAL FINDINGS
The following findings require follow up:  Radiographic finding   Finding:  CTA of the neck shows an incidental finding of exophytic thyroid nodule - This exophytic nodule measures greater than 2 cm in diameter  Incidental discovery of one or more thyroid nodule(s) measuring more than 1 5 cm aspirated the oliguric part    Ultrasound follow-up is recommended   Follow up required:  Thyroid ultrasound   Follow up should be done within 2 week(s)    Please notify the following clinician to assist with the follow up:   Dr Marisela Dennison, DO

## 2020-09-16 NOTE — ASSESSMENT & PLAN NOTE
Patient is back to baseline, CT of the head, CTA, and MRI without any significant abnormalities except for incidental finding of thyroid nodule  Duplex scan with less than 50% stenosis of carotids bilaterally  Neurology has evaluated patient and she recommended an EEG which can be done as an outpatient  Patient is stable for discharge from the clinical and neurological standpoint

## 2020-09-16 NOTE — DISCHARGE SUMMARY
Discharge- Deanna Sauer 1945, 66 y o  male MRN: [id number]    Unit/Bed#: S -01 Encounter: [id number]    Primary Care Provider: Cherrie Oneill DO   Date and time admitted to hospital: 9/14/2020  9:55 PM        Thyroid nodule  Assessment & Plan  CTA showed Incidental exophytic thyroid nodule for which nonemergent thyroid ultrasound is recommended  Will provide patient with the same             HLD (hyperlipidemia)  Assessment & Plan  Patient will be started on statin  * Amnesia, global, transient-resolved as of 9/16/2020  Assessment & Plan  Patient is back to baseline, CT of the head, CTA, and MRI without any significant abnormalities except for incidental finding of thyroid nodule  Duplex scan with less than 50% stenosis of carotids bilaterally  Neurology has evaluated patient and she recommended an EEG which can be done as an outpatient  Patient is stable for discharge from the clinical and neurological standpoint  Patient has been provided neurological follow-up as wellas EEG order to do as an oupatient    Hypertension:  Patient is being started on amlodipine 5 mg once a day at the time of discharge  His postop follow-up with primary care physician            Resolved Problems  Date Reviewed: 9/16/2020          Resolved    * (Principal) Amnesia, global, transient 9/16/2020     Resolved by  Bc Bhatt MD          Admission Date:   Admission Orders (From admission, onward)     Ordered        09/15/20 1645  Inpatient Admission  Once         09/14/20 2345  Place in Observation  Once                     Admitting Diagnosis: Transient alteration of awareness [R40 4]  Confusion [R41 0]    HPI: as per HPI noteJohn PURVI Lentz is a 66 y o  male who presents with reports of amnesia for today  Patient's wife is at bedside also providing history  The patient had a test for his  license today, reportedly drove to an airport, completed his exam, and then drove home  The patient reports to his wife then that he does not remember any of this, and only remembered waking up this morning  His wife urged him to come to the emergency department for further evaluation  Upon exam the patient is completely back to baseline at this time, however cannot recall any events of the day  Denied any difficulty with speech, facial droop, numbness, tingling, weakness, falls, trauma  This has never happened to him before  Denies any fevers, chills, neck pain, headaches, lightheadedness, dizziness  Reports he is in very good health, is not currently on any medications  He has appropriate health surveillance and follows up with his PCP as directed       Procedures Performed: No orders of the defined types were placed in this encounter  Summary of Hospital Course:     After being hospitalized patient underwent workup regarding transient global amnesia and to rule out acute CVA/stroke  MRI of the brain was unremarkable  Initial CT was also unremarkable  CT of the neck however did reveal an incidental finding off an exophytic thyroid nodule for which ultrasound was recommended  In addition to that the patient underwent neurology evaluation  The neurologist reviewed the tests and they also recommended EEG but given clinical stability he was deemed that this EEG can be done as an outpatient  Patient should follow-up with neurologist and should get an EEG done  Both followups and scripts have been provided  Patient should also follow-up with his primary care physician and should get a thyroid ultrasound done to better assess the thyroid nodule incidentally found on CT scan  Given his blood pressure patient has been diagnosed with essential hypertension and is being started on amlodipine  He is also being started on atorvastatin for hyperlipidemia as well,as aspirin    Scripts have been sent to pharmacy    Patient is being discharged clinically stable from normal neurologic exam    Physical exam:    General:  Patient is sitting in chair, awake, alert, in no acute distress  Head and neck:  Supple, trachea midline, no obvious palpable masses  Chest/respiratory:  Normal work of breathing, no chest wall expansion, aerating sounds present bilaterally no crackles rhonchi or wheezing appreciated  Cardiovascular:  Normal S1-S2, no S3, no JVD, no murmurs, rubs, gallops, RRR  Abdomen:  Soft, nontender, nondistended, bowel sounds present in all quadrants  Extremities:  Normal tone and muscle bulk  Neurological:  AAO x4, no facial grimace changes, no gross motor or sensory deficits noted, strength 5/5 in upper and lower extremities, sensory exam is preserved, ambulation breasts are deferred normal gait, DTR 2+      Significant Findings, Care, Treatment and Services Provided:     Duplex ultrasound carotids 09/15/2020    CONCLUSION:  Impression  RIGHT:  There is <50% stenosis noted in the internal carotid artery  Plaque is  homogenous and smooth  Vertebral artery flow is antegrade  There is no significant subclavian artery  disease  LEFT:  There is <50% stenosis noted in the internal carotid artery  Plaque is  homogenous and smooth  Vertebral artery flow is antegrade  There is no significant subclavian artery  disease  Recommend repeat duplex in 2 years to follow up on plaque progression  Head CT/CTA 09/15/2020    IMPRESSION:     CT brain: Stable chronic microangiopathic change with no mass, hemorrhage or acute ischemia identified      CT angiography: There is atherosclerotic disease of the aortic arch with mild narrowing of the left subclavian and moderate stenosis of the right subclavian at their origins      Common carotid artery bifurcations and proximal cervical internal carotid arteries are unremarkable      Mild intracranial atherosclerotic disease involving the left posterior cerebral artery which arises from the internal carotid artery    No occlusion or thrombosis      Incidental exophytic thyroid nodule for which nonemergent thyroid ultrasound is recommended  MRI of the brain 09/15/2020     IMPRESSION:     White matter changes suggestive of chronic microangiopathy  No acute intracranial pathology  No evidence of recent infarct  Complications: None    Condition at Discharge: good         Discharge instructions/Information to patient and family:   See after visit summary for information provided to patient and family  Provisions for Follow-Up Care:  See after visit summary for information related to follow-up care and any pertinent home health orders  PCP: Broderick Martinez DO   [facility]    Disposition: Home    Planned Readmission: No    Discharge Statement   I spent 30 minutes discharging the patient  This time was spent on the day of discharge  I had direct contact with the patient on the day of discharge  Additional documentation is required if more than 30 minutes were spent on discharge  Discharge Medications:  See after visit summary for reconciled discharge medications provided to patient and family

## 2020-09-18 ENCOUNTER — HOSPITAL ENCOUNTER (OUTPATIENT)
Dept: NEUROLOGY | Facility: CLINIC | Age: 75
Discharge: HOME | End: 2020-09-18
Payer: MEDICARE

## 2020-09-18 DIAGNOSIS — G45.4 AMNESIA, GLOBAL, TRANSIENT: ICD-10-CM

## 2020-09-18 PROCEDURE — 95816 EEG AWAKE AND DROWSY: CPT

## 2020-09-20 PROCEDURE — 95819 EEG AWAKE AND ASLEEP: CPT | Performed by: PSYCHIATRY & NEUROLOGY

## 2020-09-22 NOTE — PHYSICIAN ADVISOR
Current patient class: Inpatient  The patient is currently on Hospital Day: 3 at [facility]      The patient was admitted to the hospital at [phone] on 9/15/20 for the following diagnosis:  Transient alteration of awareness [R40 4]  Confusion [R41 0]       There is documentation in the medical record of an expected length of stay of at least 2 midnights  The patient is therefore expected to satisfy the 2 midnight benchmark and given the 2 midnight presumption is appropriate for INPATIENT ADMISSION  Given this expectation of a satisfying stay, CMS instructs us that the patient is most often appropriate for inpatient admission under part A provided medical necessity is documented in the chart  After review of the relevant documentation, labs, vital signs and test results, the patient is appropriate for INPATIENT ADMISSION  Admission to the hospital as an inpatient is a complex decision making process which requires the practitioner to consider the patients presenting complaint, history and physical examination and all relevant testing  With this in mind, in this case, the patient was deemed appropriate for INPATIENT ADMISSION  After review of the documentation and testing available at the time of the admission I concur with this clinical determination of medical necessity  Rationale is as follows: The patient is a 66 yrs old Male who presented to the ED at 9/14/2020  9:55 PM with a chief complaint of Altered Mental Status (Pt presents to ER from home with wife who brought pt in for AMS - went and flew his plane today (verified that it was without issue) and then pt began being confused thinking his motorcycle was flown to a different airport   Pt has no recollection of flying his plane today but is now alert and oriented x4 with no complaints except for the memory loss  )     Patient admitted with a report of having an episode of loss of memory which lasted for an extended period of time  He was ordered a brain MRI, CT of the head  CTA of the head and neck, US of the carotids and an EEG  He was also placed on telemetry for a 24 hour period  Neurology evaluated the patient and agreed with the above noted tests and did state the EEG could be done as an out-patient if necessary  The patient was noted to have an elevated BP and was started on medication for that concern  Imaging studies  not reveal any acute pathology that would explain the patient's problem  He did remain stable and had no further episodes that caused his presentation to the hospital   Telemetry did not reveal any acute concerning episodes  A two night admission class to the hospital would be considered appropriate for this patient as it was necessary to have him checked to rule out a CVA  The patients vitals on arrival were       History reviewed  No pertinent past medical history  Past Surgical History:   Procedure Laterality Date    HERNIA REPAIR      THYROIDECTOMY Left            Consults have been placed to:   IP CONSULT TO NEUROLOGY    Vitals:    09/15/20 1530 09/15/20 1855 09/15/20 2015 20 0700   BP: 152/74 133/79 154/68 142/67   BP Location: Right arm Right arm Right arm Left arm   Pulse: 64 62 61 (!) 54   Resp: 18 18 18 18   Temp:   98 2 °F (36 8 °C) 97 7 °F (36 5 °C)   TempSrc:   Oral Oral   SpO2: 97% 97% 97% 95%   Weight:   83 1 kg (183 lb 3 2 oz)    Height:   5' 10" (1 778 m)        Most recent labs:    No results for input(s): WBC, HGB, HCT, PLT, K, NA, CALCIUM, BUN, CREATININE, LIPASE, AMYLASE, INR, TROPONINI, CKTOTAL, AST, ALT, ALKPHOS, BILITOT in the last 72 hours  Scheduled Meds:  Continuous Infusions:No current facility-administered medications for this encounter  PRN Meds:      Surgical procedures (if appropriate):

## 2020-09-25 ENCOUNTER — OFFICE VISIT (OUTPATIENT)
Dept: FAMILY MEDICINE CLINIC | Facility: CLINIC | Age: 75
End: 2020-09-25
Payer: MEDICARE

## 2020-09-25 VITALS
DIASTOLIC BLOOD PRESSURE: 84 MMHG | WEIGHT: 187.4 LBS | BODY MASS INDEX: 26.83 KG/M2 | HEIGHT: 70 IN | RESPIRATION RATE: 18 BRPM | TEMPERATURE: 97.2 F | HEART RATE: 53 BPM | SYSTOLIC BLOOD PRESSURE: 126 MMHG | OXYGEN SATURATION: 98 %

## 2020-09-25 DIAGNOSIS — G45.4 AMNESIA, GLOBAL, TRANSIENT: Primary | ICD-10-CM

## 2020-09-25 DIAGNOSIS — Z28.21 REFUSED INFLUENZA VACCINE: ICD-10-CM

## 2020-09-25 DIAGNOSIS — I10 ESSENTIAL HYPERTENSION: ICD-10-CM

## 2020-09-25 DIAGNOSIS — E04.1 THYROID NODULE: ICD-10-CM

## 2020-09-25 PROCEDURE — 99214 OFFICE O/P EST MOD 30 MIN: CPT | Performed by: FAMILY MEDICINE

## 2020-09-25 NOTE — PATIENT INSTRUCTIONS
This patient was diagnosed with transient global amnesia  The exact etiology was undetermined at this point  The patient does have a follow-up appointment with a neurologist scheduled for October  The patient is on amlodipine 5 mg daily for his blood pressure  His blood pressure is stable  The patient is also taking atorvastatin 40 mg daily secondary to the results on the carotid Doppler  The patient has stopped taking a baby aspirin daily secondary to 2 episodes of nosebleeds  The patient did refuse a flu shot today  The patient will follow-up with me in 3 months to check his blood pressure

## 2020-09-25 NOTE — PROGRESS NOTES
Assessment/Plan:    No problem-specific Assessment & Plan notes found for this encounter  Diagnoses and all orders for this visit:    Amnesia, global, transient    Essential hypertension    Thyroid nodule    Refused influenza vaccine        Subjective:      Patient ID: Crystal Stiles is a 66 y o  male  This is a follow-up appointment for this 66-year-old male who was recently seen in the [address] [place] [address] emergency room and eventually hospitalized for an episode of global amnesia  While in emergency room  patient's blood pressure was elevated  The patient had a neurology consult for his chief complaint  He had a CT, MRI, and carotid ultrasounds done which revealed mild carotid stenosis bilaterally less than 50%  The patient CT of the brain and MRI were negative for strokes  Patient was started on aspirin, atorvastatin, and amlodipine 5 mg because of his symptoms  Patient's blood pressure did go down with the amlodipine 5 mg patient states his amnesia with lasted about 6 hours  By the time he got to the emergency room it had resolved  The following portions of the patient's history were reviewed and updated as appropriate: allergies, current medications, past family history, past medical history, past social history, past surgical history and problem list     Review of Systems   HENT: Negative  Eyes: Negative  Respiratory: Negative  Cardiovascular: Negative  Gastrointestinal: Negative  Endocrine: Negative  Musculoskeletal: Negative  Allergic/Immunologic: Negative  Neurological: Negative  Hematological: Negative  Psychiatric/Behavioral: Negative  All other systems reviewed and are negative          Objective:      /84 (BP Location: Left arm, Patient Position: Sitting, Cuff Size: Large)   Pulse (!) 53   Temp (!) 97 2 °F (36 2 °C) (Tympanic)   Resp 18   Ht 5' 10" (1 778 m)   Wt 85 kg (187 lb 6 4 oz)   SpO2 98%   BMI 26 89 kg/m²          Physical Exam  Constitutional:       Appearance: Normal appearance  Cardiovascular:      Rate and Rhythm: Regular rhythm  Pulmonary:      Effort: Pulmonary effort is normal       Breath sounds: Normal breath sounds  Musculoskeletal: Normal range of motion  Neurological:      General: No focal deficit present  Mental Status: He is alert and oriented to person, place, and time  Mental status is at baseline  Psychiatric:         Mood and Affect: Mood normal          Behavior: Behavior normal          Thought Content:  Thought content normal          Judgment: Judgment normal

## 2020-09-30 ENCOUNTER — HOSPITAL ENCOUNTER (OUTPATIENT)
Dept: ULTRASOUND IMAGING | Facility: HOSPITAL | Age: 75
Discharge: HOME | End: 2020-09-30
Attending: INTERNAL MEDICINE
Payer: MEDICARE

## 2020-09-30 DIAGNOSIS — E04.1 THYROID NODULE: ICD-10-CM

## 2020-09-30 PROCEDURE — 76536 US EXAM OF HEAD AND NECK: CPT

## 2020-10-01 ENCOUNTER — TELEPHONE (OUTPATIENT)
Dept: FAMILY MEDICINE CLINIC | Facility: CLINIC | Age: 75
End: 2020-10-01

## 2020-10-28 ENCOUNTER — TELEPHONE (OUTPATIENT)
Dept: NEUROLOGY | Facility: CLINIC | Age: 75
End: 2020-10-28

## 2020-11-03 ENCOUNTER — OFFICE VISIT (OUTPATIENT)
Dept: NEUROLOGY | Facility: CLINIC | Age: 75
End: 2020-11-03
Payer: MEDICARE

## 2020-11-03 VITALS
TEMPERATURE: 96.3 F | DIASTOLIC BLOOD PRESSURE: 72 MMHG | SYSTOLIC BLOOD PRESSURE: 120 MMHG | BODY MASS INDEX: 25.83 KG/M2 | WEIGHT: 180 LBS | HEART RATE: 73 BPM

## 2020-11-03 DIAGNOSIS — G45.4 AMNESIA, GLOBAL, TRANSIENT: Primary | ICD-10-CM

## 2020-11-03 PROCEDURE — 99213 OFFICE O/P EST LOW 20 MIN: CPT | Performed by: PSYCHIATRY & NEUROLOGY

## 2020-11-06 DIAGNOSIS — E78.49 OTHER HYPERLIPIDEMIA: ICD-10-CM

## 2020-11-08 DIAGNOSIS — I10 ESSENTIAL HYPERTENSION: ICD-10-CM

## 2020-11-08 DIAGNOSIS — E78.49 OTHER HYPERLIPIDEMIA: ICD-10-CM

## 2020-11-08 RX ORDER — AMLODIPINE BESYLATE 5 MG/1
5 TABLET ORAL DAILY
Qty: 30 TABLET | Refills: 5 | Status: SHIPPED | OUTPATIENT
Start: 2020-11-08 | End: 2020-11-10 | Stop reason: SDUPTHER

## 2020-11-08 RX ORDER — ATORVASTATIN CALCIUM 80 MG/1
80 TABLET, FILM COATED ORAL
Qty: 30 TABLET | Refills: 1 | OUTPATIENT
Start: 2020-11-08

## 2020-11-08 RX ORDER — ATORVASTATIN CALCIUM 80 MG/1
80 TABLET, FILM COATED ORAL
Qty: 30 TABLET | Refills: 5 | Status: SHIPPED | OUTPATIENT
Start: 2020-11-08 | End: 2021-04-22 | Stop reason: SDUPTHER

## 2020-11-10 DIAGNOSIS — I10 ESSENTIAL HYPERTENSION: ICD-10-CM

## 2020-11-12 RX ORDER — AMLODIPINE BESYLATE 5 MG/1
5 TABLET ORAL DAILY
Qty: 30 TABLET | Refills: 5 | Status: SHIPPED | OUTPATIENT
Start: 2020-11-12 | End: 2021-04-22 | Stop reason: SDUPTHER

## 2021-01-22 DIAGNOSIS — Z23 ENCOUNTER FOR IMMUNIZATION: ICD-10-CM

## 2021-01-27 ENCOUNTER — APPOINTMENT (EMERGENCY)
Dept: RADIOLOGY | Facility: HOSPITAL | Age: 76
End: 2021-01-27
Payer: MEDICARE

## 2021-01-27 ENCOUNTER — HOSPITAL ENCOUNTER (EMERGENCY)
Facility: HOSPITAL | Age: 76
Discharge: HOME | End: 2021-01-27
Attending: EMERGENCY MEDICINE | Admitting: EMERGENCY MEDICINE
Payer: MEDICARE

## 2021-01-27 VITALS
BODY MASS INDEX: 27.55 KG/M2 | TEMPERATURE: 96.7 F | WEIGHT: 192 LBS | SYSTOLIC BLOOD PRESSURE: 169 MMHG | OXYGEN SATURATION: 98 % | RESPIRATION RATE: 20 BRPM | DIASTOLIC BLOOD PRESSURE: 79 MMHG | HEART RATE: 69 BPM

## 2021-01-27 DIAGNOSIS — S91.312A LACERATION OF LEFT FOOT, INITIAL ENCOUNTER: Primary | ICD-10-CM

## 2021-01-27 DIAGNOSIS — S90.852A FOREIGN BODY IN LEFT FOOT, INITIAL ENCOUNTER: ICD-10-CM

## 2021-01-27 PROCEDURE — 99283 EMERGENCY DEPT VISIT LOW MDM: CPT

## 2021-01-27 PROCEDURE — 99283 EMERGENCY DEPT VISIT LOW MDM: CPT | Performed by: EMERGENCY MEDICINE

## 2021-01-27 PROCEDURE — 12001 RPR S/N/AX/GEN/TRNK 2.5CM/<: CPT | Performed by: EMERGENCY MEDICINE

## 2021-01-27 PROCEDURE — 90715 TDAP VACCINE 7 YRS/> IM: CPT | Performed by: EMERGENCY MEDICINE

## 2021-01-27 PROCEDURE — 90471 IMMUNIZATION ADMIN: CPT

## 2021-01-27 PROCEDURE — 73630 X-RAY EXAM OF FOOT: CPT

## 2021-01-27 RX ORDER — CEPHALEXIN 500 MG/1
500 CAPSULE ORAL ONCE
Status: COMPLETED | OUTPATIENT
Start: 2021-01-27 | End: 2021-01-27

## 2021-01-27 RX ORDER — CEPHALEXIN 500 MG/1
500 CAPSULE ORAL EVERY 6 HOURS SCHEDULED
Qty: 28 CAPSULE | Refills: 0 | Status: SHIPPED | OUTPATIENT
Start: 2021-01-27 | End: 2021-02-03

## 2021-01-27 RX ORDER — GINSENG 100 MG
1 CAPSULE ORAL ONCE
Status: COMPLETED | OUTPATIENT
Start: 2021-01-27 | End: 2021-01-27

## 2021-01-27 RX ADMIN — TETANUS TOXOID, REDUCED DIPHTHERIA TOXOID AND ACELLULAR PERTUSSIS VACCINE, ADSORBED 0.5 ML: 5; 2.5; 8; 8; 2.5 SUSPENSION INTRAMUSCULAR at 20:43

## 2021-01-27 RX ADMIN — CEPHALEXIN 500 MG: 500 CAPSULE ORAL at 21:23

## 2021-01-27 RX ADMIN — BACITRACIN 1 SMALL APPLICATION: 500 OINTMENT TOPICAL at 20:43

## 2021-01-28 ENCOUNTER — OFFICE VISIT (OUTPATIENT)
Dept: PODIATRY | Facility: CLINIC | Age: 76
End: 2021-01-28
Payer: MEDICARE

## 2021-01-28 VITALS — BODY MASS INDEX: 27.2 KG/M2 | WEIGHT: 190 LBS | HEIGHT: 70 IN

## 2021-01-28 DIAGNOSIS — S91.322A LACERATION WITH FOREIGN BODY, LEFT FOOT, INITIAL ENCOUNTER: Primary | ICD-10-CM

## 2021-01-28 PROCEDURE — 99203 OFFICE O/P NEW LOW 30 MIN: CPT | Performed by: PODIATRIST

## 2021-01-28 PROCEDURE — 28193 REMOVAL OF FOOT FOREIGN BODY: CPT | Performed by: PODIATRIST

## 2021-01-28 NOTE — PROGRESS NOTES
PATIENT:  Esther Leon  1945       ASSESSMENT:     1  Laceration with foreign body, left foot, initial encounter  Foreign body removal             PLAN:  1  Patient was counseled and educated on the condition and the diagnosis  2  ED records were reviewed  X-ray was personally reviewed  The radiological findings were discussed with the patient  3  The diagnosis, treatment options and prognosis were discussed with the patient  4  Recommended exploration and removal of foreign body with repair of laceration  Explained it was still possible I would not be able to remove it in the office  5  Surgery was performed as in the procedure and it was retrieved without complication  6  Instructed resting, elevation, and icing  Continue oral antibiotics as prescribed  Discussed proper dressing care  7  I will see him on Tuesday  Instructed to call the office if he has any increased pain, worsening of condition, or signs of infection  Universal Protocol:  Consent: Verbal consent obtained  Risks and benefits: risks, benefits and alternatives were discussed  Consent given by: patient  Time out: Immediately prior to procedure a "time out" was called to verify the correct patient, procedure, equipment, support staff and site/side marked as required    Timeout called at: 1/28/2021 4:10 PM   Patient understanding: patient states understanding of the procedure being performed  Site marked: the operative site was marked  Patient identity confirmed: verbally with patient    Foreign body removal    Date/Time: 1/28/2021 4:09 PM  Performed by: Kelley Cheng DPM  Authorized by: Kelley Cheng DPM   Body area: skin  General location: lower extremity  Location details: left foot  Anesthesia: local infiltration    Anesthesia:  Local Anesthetic: lidocaine 1% with epinephrine and lidocaine 1% without epinephrine  Anesthetic total: 5 mL    Sedation:  Patient sedated: no  Patient restrained: no  Patient cooperative: yes  Localization method: magnification and probed  Removal mechanism: hemostat  Dressing: antibiotic ointment and dressing applied  Tendon involvement: complex  Depth: deep  Complexity: complex  1 objects recovered  Objects recovered: 1 2 X 0 6 X 0 2 cm metal piece  Post-procedure assessment: foreign body removed  Patient tolerance: patient tolerated the procedure well with no immediate complications  Comments: Verbal consent and time out were done  Local anesthesia was achieved using 5 ml of 1% Lidocaine pl and 1% Lidocaine with epi in a 1:1 mixture  Left foot was then scrubbed, prepped and draped in the usual aseptic manner  Attention was then directed to the dorsal aspect of left foot  There was a V shaped 0 5 cm laceration on left dorsal foot  Measurement was obtained from the tip of the toe to the foreign body on the radiographic images  Foreign body was about 1 cm proximal to the laceration  Incision was made proximally  Blunt dissection was carried to subcutaneous tissues  Foreign body was located in deep tissues and it was palpated under the fascia  Soft tissues under the fascia was then freed and foreign body was isolated  It was then removed using a hemostat  It was removed in entirety  There was no more foreign body from the examination  Wound was irrigated with copious amount of NSS  Non-viable skin from the laceration was excised to healthy margin  Then wound was repaired using 4 0 nylon  Bacitracin and sterile dressing was applied  Then, ACE wrap was applied for compression  Surgical shoe was then placed  Subjective:     HPI  The patient presents with chief complaint of foreign body of left foot  He was chopping firewood yesterday  Then, he noticed bleeding on left foot in the evening and saw a laceration  He went to ED for evaluation  X-ray showed a foreign body  ED doctor tried to remove it without a success    I personally spoke with ED physician last night and he was referred to my office  He was put on oral antibiotics  His pain is stable  No active bleeding  No numbness or paresthesia  No weakness of left foot  He has mild swelling on left foot  He reported that a piece of metal might have flown into his work boot and skin while he was chopping firewood  The following portions of the patient's history were reviewed and updated as appropriate: allergies, current medications, past family history, past medical history, past social history, past surgical history and problem list   All pertinent labs and images were reviewed  Past Medical History  Past Medical History:   Diagnosis Date    High cholesterol     Hypertension        Past Surgical History  Past Surgical History:   Procedure Laterality Date    HERNIA REPAIR      THYROIDECTOMY Left         Allergies:  Patient has no known allergies  Medications:  Current Outpatient Medications   Medication Sig Dispense Refill    amLODIPine (NORVASC) 5 mg tablet Take 1 tablet (5 mg total) by mouth daily 30 tablet 5    atorvastatin (LIPITOR) 80 mg tablet Take 1 tablet (80 mg total) by mouth daily with dinner 30 tablet 5    cephalexin (KEFLEX) 500 mg capsule Take 1 capsule (500 mg total) by mouth every 6 (six) hours for 7 days 28 capsule 0    aspirin 81 mg chewable tablet Chew 1 tablet (81 mg total) daily (Patient not taking: Reported on 9/25/2020) 30 tablet 1     No current facility-administered medications for this visit          Social History:  Social History     Socioeconomic History    Marital status: /Civil Union     Spouse name: None    Number of children: None    Years of education: None    Highest education level: None   Occupational History    None   Social Needs    Financial resource strain: None    Food insecurity     Worry: None     Inability: None    Transportation needs     Medical: None     Non-medical: None   Tobacco Use    Smoking status: Never Smoker    Smokeless tobacco: Never Used   Substance and Sexual Activity    Alcohol use: Yes     Frequency: Monthly or less     Drinks per session: 1 or 2    Drug use: Never    Sexual activity: None   Lifestyle    Physical activity     Days per week: None     Minutes per session: None    Stress: None   Relationships    Social connections     Talks on phone: None     Gets together: None     Attends [employer] service: None     Active member of club or organization: None     Attends meetings of clubs or organizations: None     Relationship status: None    Intimate partner violence     Fear of current or ex partner: None     Emotionally abused: None     Physically abused: None     Forced sexual activity: None   Other Topics Concern    None   Social History Narrative    Lives with spouse           Review of Systems   Constitutional: Negative for appetite change, chills and fever  HENT: Negative for sore throat  Respiratory: Negative for cough and shortness of breath  Cardiovascular: Negative for chest pain  Gastrointestinal: Negative for diarrhea, rectal pain and vomiting  Musculoskeletal: Negative for gait problem  Skin: Positive for wound  Allergic/Immunologic: Negative for immunocompromised state  Neurological: Negative for weakness and numbness  Hematological: Does not bruise/bleed easily  Psychiatric/Behavioral: Negative for behavioral problems and confusion  Objective:      Ht 5' 10" (1 778 m)   Wt 86 2 kg (190 lb)   BMI 27 26 kg/m²          Physical Exam  Vitals signs reviewed  Constitutional:       General: He is not in acute distress  Appearance: Normal appearance  He is not ill-appearing  HENT:      Head: Normocephalic and atraumatic  Neck:      Musculoskeletal: Normal range of motion and neck supple  Cardiovascular:      Rate and Rhythm: Normal rate and regular rhythm  Pulses: Normal pulses     Pulmonary:      Effort: Pulmonary effort is normal  No respiratory distress  Musculoskeletal: Normal range of motion  General: No tenderness or signs of injury  Right lower leg: No edema  Left lower leg: No edema  Skin:     General: Skin is warm and moist       Capillary Refill: Capillary refill takes less than 2 seconds  Coloration: Skin is not cyanotic or mottled  Findings: Laceration present  No rash  Rash is not purpuric  Nails: There is no clubbing  Comments: V shaped laceration on left dorsal midfoot  No obvious foreign body from the exam   Mild edema around the area  No signs of infection  No purulence  Neurological:      General: No focal deficit present  Mental Status: He is alert and oriented to person, place, and time  Cranial Nerves: No cranial nerve deficit  Sensory: No sensory deficit  Motor: No weakness  Coordination: Coordination normal    Psychiatric:         Mood and Affect: Mood normal          Thought Content:  Thought content normal          Judgment: Judgment normal

## 2021-01-28 NOTE — DISCHARGE INSTRUCTIONS
You need to call the podiatrist tomorrow morning to be seen  I did speak with Dr Ferdinand Watson on call  Make sure you tell the office you are being referred from the ER

## 2021-01-28 NOTE — ED PROVIDER NOTES
History  Chief Complaint   Patient presents with    Foot Laceration     States he was splitting firewood today at 3 pm with sledgehammer, was wearing work boots, but somehow got a laceration top of left foot  1 cm laceration noted, bleeding stopped      65 yo male was cutting firewood using ax and sledge hammer wearing heavy work boots about 3 pm   Later when took off boot noted lac to top of left foot  He is not sure how it happened  No pain  No associated symptoms  History provided by:  Patient   used: No    Foot Laceration  Associated symptoms: no fever        Prior to Admission Medications   Prescriptions Last Dose Informant Patient Reported? Taking? amLODIPine (NORVASC) 5 mg tablet 1/27/2021 at Unknown time  No Yes   Sig: Take 1 tablet (5 mg total) by mouth daily   aspirin 81 mg chewable tablet   No No   Sig: Chew 1 tablet (81 mg total) daily   Patient not taking: Reported on 9/25/2020   atorvastatin (LIPITOR) 80 mg tablet 1/27/2021 at Unknown time  No Yes   Sig: Take 1 tablet (80 mg total) by mouth daily with dinner      Facility-Administered Medications: None       Past Medical History:   Diagnosis Date    High cholesterol     Hypertension        Past Surgical History:   Procedure Laterality Date    HERNIA REPAIR      THYROIDECTOMY Left        Family History   Problem Relation Age of Onset    Lymphoma Son         Non-Hodgkin's lymphoma     Alzheimer's disease Mother     Dementia Paternal Uncle      I have reviewed and agree with the history as documented  E-Cigarette/Vaping    E-Cigarette Use Never User      E-Cigarette/Vaping Substances     Social History     Tobacco Use    Smoking status: Never Smoker    Smokeless tobacco: Never Used   Substance Use Topics    Alcohol use: Yes     Frequency: Monthly or less     Drinks per session: 1 or 2    Drug use: Never       Review of Systems   Constitutional: Negative for fever  Respiratory: Negative for cough  Gastrointestinal: Negative for diarrhea and vomiting  Skin: Positive for wound  Physical Exam  Physical Exam  Vitals signs and nursing note reviewed  Constitutional:       General: He is not in acute distress  Appearance: Normal appearance  He is not ill-appearing  Neck:      Musculoskeletal: Neck supple  Pulmonary:      Effort: Pulmonary effort is normal    Musculoskeletal: Normal range of motion  Skin:     General: Skin is warm and dry  Comments: Top of left foot 1 5 cm straight lac, no FB seen or palpable, distal m/s intact, DP intact   Neurological:      General: No focal deficit present  Mental Status: He is alert and oriented to person, place, and time  Psychiatric:         Mood and Affect: Mood normal          Behavior: Behavior normal          Vital Signs  ED Triage Vitals [01/27/21 2019]   Temperature Pulse Respirations Blood Pressure SpO2   (!) 96 7 °F (35 9 °C) 69 20 169/79 98 %      Temp Source Heart Rate Source Patient Position - Orthostatic VS BP Location FiO2 (%)   Tympanic Monitor Lying Right arm --      Pain Score       3           Vitals:    01/27/21 2019   BP: 169/79   Pulse: 69   Patient Position - Orthostatic VS: Lying         Visual Acuity      ED Medications  Medications   tetanus-diphtheria-acellular pertussis (BOOSTRIX) IM injection 0 5 mL (0 5 mL Intramuscular Given 1/27/21 2043)   bacitracin topical ointment 1 small application (1 small application Topical Given 1/27/21 2043)   cephalexin (KEFLEX) capsule 500 mg (500 mg Oral Given 1/27/21 2123)       Diagnostic Studies  Results Reviewed     None                 XR foot 3+ views LEFT    (Results Pending)              Procedures  Laceration repair    Date/Time: 1/27/2021 8:39 PM  Performed by: Maddison Herrmann MD  Authorized by: Maddison Herrmann MD   Consent: Verbal consent obtained    Consent given by: patient  Patient identity confirmed: verbally with patient  Tendon involvement: none  Nerve involvement: none  Vascular damage: no  Anesthesia: local infiltration    Anesthesia:  Local Anesthetic: lidocaine 1% with epinephrine    Sedation:  Patient sedated: no      Wound Dehiscence:  Superficial Wound Dehiscence: simple closure      Procedure Details:  Preparation: Patient was prepped and draped in the usual sterile fashion  Irrigation solution: saline  Irrigation method: syringe  Amount of cleaning: standard  Debridement: none  Degree of undermining: none  Skin closure: 5-0 nylon  Number of sutures: 3  Approximation: close  Approximation difficulty: simple  Patient tolerance: patient tolerated the procedure well with no immediate complications  Comments: Nurse applied bacitracin and dressing after procedure               ED Course                                           MDM  Number of Diagnoses or Management Options  Foreign body in left foot, initial encounter:   Laceration of left foot, initial encounter:   Diagnosis management comments: Xrays done after lac repair and FB seen  Sutures removed and wound explored more  Unable to see or definitely feel a FB  Advised of need to follow up with podiatry for FB sinceI was unable to remove it  Wound re-irrigated and steri strips applied  Keflex prescribed  I spoke with Dr Enrique Pineda on call for podiatry who will see pt  In office tomorrow  Tetanus updated        Disposition  Final diagnoses:   Laceration of left foot, initial encounter   Foreign body in left foot, initial encounter     Time reflects when diagnosis was documented in both MDM as applicable and the Disposition within this note     Time User Action Codes Description Comment    9/17/8751  4:35 PM Steven Borrego Add [K74 113E] Laceration of left foot, initial encounter     6/08/2349  5:46 PM Steven Borrego Add [O55 674O] Foreign body in left foot, initial encounter       ED Disposition     ED Disposition Condition Date/Time Comment    Discharge Stable Wed Jan 27, 2021  8:36 PM [address] discharge to home/self care  Follow-up Information     Follow up With Specialties Details Why [address] , DO Family Medicine  For suture removal [address]      Josiah Kayser, DPM Podiatry, Wound Care Call  tomorrow morning [id number] [address]   [phone]            Discharge Medication List as of 1/27/2021  9:19 PM      START taking these medications    Details   cephalexin (KEFLEX) 500 mg capsule Take 1 capsule (500 mg total) by mouth every 6 (six) hours for 7 days, Starting Wed 1/27/2021, Until Wed 2/3/2021, Normal         CONTINUE these medications which have NOT CHANGED    Details   amLODIPine (NORVASC) 5 mg tablet Take 1 tablet (5 mg total) by mouth daily, Starting u 11/12/2020, Normal      atorvastatin (LIPITOR) 80 mg tablet Take 1 tablet (80 mg total) by mouth daily with dinner, Starting Sun 11/8/2020, Normal      aspirin 81 mg chewable tablet Chew 1 tablet (81 mg total) daily, Starting Thu 9/17/2020, Normal           No discharge procedures on file      PDMP Review     None          ED Provider  Electronically Signed by           Scot Perez MD  34/45/68 7715       Scot Perez MD  12/88/95 6518

## 2021-02-02 ENCOUNTER — TELEMEDICINE (OUTPATIENT)
Dept: PODIATRY | Facility: CLINIC | Age: 76
End: 2021-02-02

## 2021-02-02 DIAGNOSIS — S91.322A LACERATION WITH FOREIGN BODY, LEFT FOOT, INITIAL ENCOUNTER: Primary | ICD-10-CM

## 2021-02-02 PROCEDURE — 99024 POSTOP FOLLOW-UP VISIT: CPT | Performed by: PODIATRIST

## 2021-02-02 NOTE — PROGRESS NOTES
Virtual Brief Visit    Assessment/Plan:    Problem List Items Addressed This Visit     None      Visit Diagnoses     Laceration with foreign body, left foot, initial encounter    -  Primary        Evaluated his wound from the images  Incision looks well and instructed daily local care  Continue resting, compression, and elevation  Instructed to call the office for any increased pain, signs of infection, or worsening  RA in 1 week for evaluation  Reason for visit is   Chief Complaint   Patient presents with    Virtual Brief Visit        Encounter provider Monae Linda DPM    Provider located at [address] [place] [id number] [phone]    Recent Visits  Date Type Provider Dept   01/28/21 Office Visit Monae Linda DPM [facility]   Showing recent visits within past 7 days and meeting all other requirements     Today's Visits  Date Type Provider Dept   02/02/21 [id number] [facility],[address], [facility]   Showing today's visits and meeting all other requirements     Future Appointments  No visits were found meeting these conditions  Showing future appointments within next 150 days and meeting all other requirements        After connecting through telephone, the patient was identified by name and date of birth  Yoan Araujo was informed that this is a telemedicine visit and that the visit is being conducted through telephone  My office door was closed  No one else was in the room  He acknowledged consent and understanding of privacy and security of the platform  The patient has agreed to participate and understands he can discontinue the visit at any time  Patient is aware this is a billable service  Subjective  HPI   Yoan Araujo is a 66 y o  male, seen for left foot evaluation  He feels well and pain is much better now  Tolerated abx well with no side effects          Past Medical History:   Diagnosis Date    High cholesterol     Hypertension        Past Surgical History:   Procedure Laterality Date    HERNIA REPAIR      THYROIDECTOMY Left        Current Outpatient Medications   Medication Sig Dispense Refill    amLODIPine (NORVASC) 5 mg tablet Take 1 tablet (5 mg total) by mouth daily 30 tablet 5    aspirin 81 mg chewable tablet Chew 1 tablet (81 mg total) daily (Patient not taking: Reported on 9/25/2020) 30 tablet 1    atorvastatin (LIPITOR) 80 mg tablet Take 1 tablet (80 mg total) by mouth daily with dinner 30 tablet 5    cephalexin (KEFLEX) 500 mg capsule Take 1 capsule (500 mg total) by mouth every 6 (six) hours for 7 days 28 capsule 0     No current facility-administered medications for this visit  No Known Allergies    Review of Systems   Constitutional: Negative for chills, fatigue and fever  Respiratory: Negative for shortness of breath  Cardiovascular: Negative for chest pain  Gastrointestinal: Negative for diarrhea, nausea and vomiting  PHYSICAL EXAMINATION:  Incision is coapted left foot  No drainage  Decreased redness  Edema resolving well  No signs of infection  I spent 10 minutes directly with the patient during this visit    [id number] acknowledges that he has consented to an online visit or consultation  He understands that the online visit is based solely on information provided by him, and that, in the absence of a face-to-face physical evaluation by the physician, the diagnosis he receives is both limited and provisional in terms of accuracy and completeness  This is not intended to replace a full medical face-to-face evaluation by the physician  Meeta Macario understands and accepts these terms

## 2021-02-04 ENCOUNTER — IMMUNIZATIONS (OUTPATIENT)
Dept: FAMILY MEDICINE CLINIC | Facility: HOSPITAL | Age: 76
End: 2021-02-04

## 2021-02-04 DIAGNOSIS — Z23 ENCOUNTER FOR IMMUNIZATION: Primary | ICD-10-CM

## 2021-02-04 PROCEDURE — 0001A SARS-COV-2 / COVID-19 MRNA VACCINE (PFIZER-BIONTECH) 30 MCG: CPT

## 2021-02-04 PROCEDURE — 91300 SARS-COV-2 / COVID-19 MRNA VACCINE (PFIZER-BIONTECH) 30 MCG: CPT

## 2021-02-11 ENCOUNTER — OFFICE VISIT (OUTPATIENT)
Dept: PODIATRY | Facility: CLINIC | Age: 76
End: 2021-02-11

## 2021-02-11 VITALS
SYSTOLIC BLOOD PRESSURE: 140 MMHG | HEART RATE: 76 BPM | BODY MASS INDEX: 27.06 KG/M2 | HEIGHT: 70 IN | DIASTOLIC BLOOD PRESSURE: 86 MMHG | WEIGHT: 189 LBS

## 2021-02-11 DIAGNOSIS — S91.322A LACERATION WITH FOREIGN BODY, LEFT FOOT, INITIAL ENCOUNTER: Primary | ICD-10-CM

## 2021-02-11 PROCEDURE — 99024 POSTOP FOLLOW-UP VISIT: CPT | Performed by: PODIATRIST

## 2021-02-11 NOTE — PROGRESS NOTES
PATIENT:  Esther Leon      1945    ASSESSMENT     1  Laceration with foreign body, left foot, initial encounter            PLAN  Patient is doing well post-operatively  Sutures removed  Instructed skin care and protection  Advance shoes and activity as tolerated  Call if any increase in pain, fevers, calf pain, shortness of breath, or general distress is noted  Patient instructed to go to ER if call is not returned immediately  HISTORY OF PRESENT ILLNESS  Patient presents for post-op appointment  Pain is minimal   Swelling resolved  The patient is feeling well and in good spirits  Patient reported no post-op concern  REVIEW OF SYSTEMS  GENERAL: No fever or chills  HEART: No chest pain, or palpitation  RESPIRATORY:  No SOB or cough  GI: No Nausea, vomit or diarrhea  NEUROLOGIC: No syncope or acute weakness  MUSCULOSKELETAL: No calf pain or edema  PHYSICAL EXAMINATION  GENERAL  The patient appears in NAD / non-toxic  Afebrile  VSS    VASCULAR EXAM  Pedal pulses and vascular status are intact  No cyanosis  DERMATOLOGIC EXAM  Incision is coapted and healed  No signs of infection  No drainage  Minimal edema  No necrosis or dehiscence  NEUROLOGIC EXAM  AAO X 3  No focal neurologic deficit  Neurologic status is intact BLE  MUSCULOSKELETAL EXAM  Normal post-op findings  ROM intact  No fluctuation or crepitus

## 2021-02-27 ENCOUNTER — IMMUNIZATIONS (OUTPATIENT)
Dept: FAMILY MEDICINE CLINIC | Facility: HOSPITAL | Age: 76
End: 2021-02-27

## 2021-02-27 DIAGNOSIS — Z23 ENCOUNTER FOR IMMUNIZATION: Primary | ICD-10-CM

## 2021-02-27 PROCEDURE — 91300 SARS-COV-2 / COVID-19 MRNA VACCINE (PFIZER-BIONTECH) 30 MCG: CPT

## 2021-02-27 PROCEDURE — 0002A SARS-COV-2 / COVID-19 MRNA VACCINE (PFIZER-BIONTECH) 30 MCG: CPT

## 2021-04-22 DIAGNOSIS — E78.49 OTHER HYPERLIPIDEMIA: ICD-10-CM

## 2021-04-22 DIAGNOSIS — I10 ESSENTIAL HYPERTENSION: ICD-10-CM

## 2021-04-22 RX ORDER — AMLODIPINE BESYLATE 5 MG/1
5 TABLET ORAL DAILY
Qty: 30 TABLET | Refills: 5 | Status: SHIPPED | OUTPATIENT
Start: 2021-04-22 | End: 2021-10-12 | Stop reason: SDUPTHER

## 2021-04-22 RX ORDER — ATORVASTATIN CALCIUM 80 MG/1
80 TABLET, FILM COATED ORAL
Qty: 30 TABLET | Refills: 5 | Status: SHIPPED | OUTPATIENT
Start: 2021-04-22 | End: 2021-10-12 | Stop reason: SDUPTHER

## 2021-04-22 NOTE — TELEPHONE ENCOUNTER
Patient requesting refill of Amlodipine and Atorvastatin to [facility]  Last seen 9/25/20   thanks

## 2021-09-02 ENCOUNTER — OFFICE VISIT (OUTPATIENT)
Dept: GASTROENTEROLOGY | Facility: CLINIC | Age: 76
End: 2021-09-02
Payer: MEDICARE

## 2021-09-02 VITALS
TEMPERATURE: 97.6 F | DIASTOLIC BLOOD PRESSURE: 74 MMHG | BODY MASS INDEX: 27.2 KG/M2 | SYSTOLIC BLOOD PRESSURE: 139 MMHG | HEART RATE: 61 BPM | WEIGHT: 190 LBS | HEIGHT: 70 IN

## 2021-09-02 DIAGNOSIS — Z86.010 HISTORY OF COLON POLYPS: ICD-10-CM

## 2021-09-02 DIAGNOSIS — R10.13 DYSPEPSIA: Primary | ICD-10-CM

## 2021-09-02 PROBLEM — Z86.0100 HISTORY OF COLON POLYPS: Status: ACTIVE | Noted: 2021-09-02

## 2021-09-02 PROCEDURE — 99204 OFFICE O/P NEW MOD 45 MIN: CPT | Performed by: INTERNAL MEDICINE

## 2021-09-02 RX ORDER — SODIUM, POTASSIUM,MAG SULFATES 17.5-3.13G
2 SOLUTION, RECONSTITUTED, ORAL ORAL SEE ADMIN INSTRUCTIONS
Qty: 177 ML | Refills: 0 | Status: SHIPPED | OUTPATIENT
Start: 2021-09-02

## 2021-09-02 NOTE — ASSESSMENT & PLAN NOTE
Rule out peptic ulcer disease or gastric erosions  Rule out H pylori gastritis  -  Patient was explained about the lifestyle and dietary modifications  Advised to avoid fatty foods, chocolates, caffeine, alcohol and any other triggering foods  Avoid eating for at least 3 hours before going to bed          -  Schedule for upper endoscopy

## 2021-09-02 NOTE — PROGRESS NOTES
Consultation - [facility]  Veronica Sofia 1945 male         Chief Complaint:  Burping, history of colon polyps    HPI:   73-year-old male with history of hypertension, hyperlipidemia, colon polyps reports having occasional symptoms of belching and burping  Complaining about mild discomfort in the periumbilical area at times  Good appetite, no recent weight loss  Regular bowel movements and denies any blood or mucus in the stool  Denies any nausea or vomiting  Denies any heartburn acid reflux  Denies any difficulty swallowing  He had colonoscopy about 7-8 years ago  REVIEW OF SYSTEMS: Review of Systems   Constitutional: Negative for activity change, appetite change, chills, diaphoresis, fatigue, fever and unexpected weight change  HENT: Negative for ear discharge, ear pain, facial swelling, hearing loss, nosebleeds, sore throat, tinnitus and voice change  Eyes: Negative for pain, discharge, redness, itching and visual disturbance  Respiratory: Negative for apnea, cough, chest tightness, shortness of breath and wheezing  Cardiovascular: Negative for chest pain and palpitations  Gastrointestinal:        As noted in HPI   Endocrine: Negative for cold intolerance, heat intolerance and polyuria  Genitourinary: Positive for difficulty urinating  Negative for dysuria, flank pain, hematuria and urgency  Musculoskeletal: Positive for arthralgias  Negative for back pain, gait problem, joint swelling and myalgias  Skin: Negative for rash and wound  Neurological: Negative for dizziness, tremors, seizures, speech difficulty, light-headedness, numbness and headaches  Hematological: Negative for adenopathy  Does not bruise/bleed easily  Psychiatric/Behavioral: Negative for agitation, behavioral problems and confusion  The patient is not nervous/anxious           Past Medical History:   Diagnosis Date    High cholesterol     Hypertension       Past Surgical History: Procedure Laterality Date    HERNIA REPAIR      THYROIDECTOMY Left      Social History     Socioeconomic History    Marital status: /Civil Union     Spouse name: Not on file    Number of children: Not on file    Years of education: Not on file    Highest education level: Not on file   Occupational History    Not on file   Tobacco Use    Smoking status: Never Smoker    Smokeless tobacco: Never Used   Vaping Use    Vaping Use: Never used   Substance and Sexual Activity    Alcohol use: Yes    Drug use: Never    Sexual activity: Not on file   Other Topics Concern    Not on file   Social History Narrative    Lives with spouse      Social Determinants of Health     Financial Resource Strain:     Difficulty of Paying Living Expenses:    Food Insecurity:     Worried About Running Out of Food in the Last Year:     [address] in the Last Year:    Transportation Needs:     Lack of Transportation (Medical):  Lack of Transportation (Non-Medical):    Physical Activity:     Days of Exercise per Week:     Minutes of Exercise per Session:    Stress:     Feeling of Stress :    Social Connections:     Frequency of Communication with Friends and Family:     Frequency of Social Gatherings with Friends and Family:     Attends [employer] Services:     Active Member of Clubs or Organizations:     Attends Club or Organization Meetings:     Marital Status:    Intimate Partner Violence:     Fear of Current or Ex-Partner:     Emotionally Abused:     Physically Abused:     Sexually Abused:      Family History   Problem Relation Age of Onset    Lymphoma Son         Non-Hodgkin's lymphoma     Alzheimer's disease Mother     Dementia Paternal Uncle      Patient has no known allergies    Current Outpatient Medications   Medication Sig Dispense Refill    amLODIPine (NORVASC) 5 mg tablet Take 1 tablet (5 mg total) by mouth daily 30 tablet 5    atorvastatin (LIPITOR) 80 mg tablet Take 1 tablet (80 mg total) by mouth daily with dinner 30 tablet 5    aspirin 81 mg chewable tablet Chew 1 tablet (81 mg total) daily (Patient not taking: Reported on 9/2/2021) 30 tablet 1    Na Sulfate-K Sulfate-Mg Sulf (Suprep Bowel Prep Kit) 17 5-3 13-1 6 GM/177ML SOLN Take 2 Bottles by mouth see administration instructions Please follow the instructions from the office 177 mL 0     No current facility-administered medications for this visit  Blood pressure 139/74, pulse 61, temperature 97 6 °F (36 4 °C), height 5' 10" (1 778 m), weight 86 2 kg (190 lb)  PHYSICAL EXAM: Physical Exam  Constitutional:       Appearance: He is well-developed  HENT:      Head: Normocephalic and atraumatic  Eyes:      General: No scleral icterus  Right eye: No discharge  Left eye: No discharge  Conjunctiva/sclera: Conjunctivae normal       Pupils: Pupils are equal, round, and reactive to light  Neck:      Thyroid: No thyromegaly  Vascular: No JVD  Trachea: No tracheal deviation  Cardiovascular:      Rate and Rhythm: Normal rate and regular rhythm  Heart sounds: Normal heart sounds  No murmur heard  No friction rub  No gallop  Pulmonary:      Effort: Pulmonary effort is normal  No respiratory distress  Breath sounds: Normal breath sounds  No wheezing or rales  Chest:      Chest wall: No tenderness  Abdominal:      General: Bowel sounds are normal  There is no distension  Palpations: Abdomen is soft  There is no mass  Tenderness: There is no abdominal tenderness  There is no guarding or rebound  Hernia: No hernia is present  Musculoskeletal:      Cervical back: Neck supple  Lymphadenopathy:      Cervical: No cervical adenopathy  Skin:     General: Skin is warm and dry  Findings: No erythema or rash  Neurological:      Mental Status: He is alert and oriented to person, place, and time     Psychiatric:         Behavior: Behavior normal          Thought Content: Thought content normal           Lab Results   Component Value Date    WBC 6 26 09/15/2020    HGB 12 9 09/15/2020    HCT 37 9 09/15/2020    MCV 92 09/15/2020     09/15/2020     Lab Results   Component Value Date    GLUCOSE 95 12/08/2016    CALCIUM 8 6 09/15/2020     12/08/2016    K 3 8 09/15/2020    CO2 24 09/15/2020     09/15/2020    BUN 15 09/15/2020    CREATININE 0 57 (L) 09/15/2020     Lab Results   Component Value Date    ALT 24 09/14/2020    AST 26 09/14/2020    ALKPHOS 65 09/14/2020    BILITOT 0 3 12/08/2016     No results found for: INR, PROTIME    XR foot 3+ views LEFT    Result Date: 1/28/2021  Impression: Metallic foreign body in the soft tissues of the dorsum of the midfoot  No fracture or dislocation  Agree with ER interpretation  Workstation performed: [id number]       ASSESSMENT & PLAN:    Dyspepsia    Rule out peptic ulcer disease or gastric erosions  Rule out H pylori gastritis  -  Patient was explained about the lifestyle and dietary modifications  Advised to avoid fatty foods, chocolates, caffeine, alcohol and any other triggering foods  Avoid eating for at least 3 hours before going to bed  -  Schedule for upper endoscopy    History of colon polyps   Personal history of colon polyps- patient is at increased risk for colon cancer screening  Rule out colorectal lesions including polyps or malignancy       -Schedule for colonoscopy  -High-fiber diet     -Patient was given instructions about the colonoscopy prep     -Patient was explained about  the risks and benefits of the procedure  Risks including but not limited to bleeding, infection, perforation were explained in detail  Also explained about less than 100% sensitivity with the exam and other alternatives

## 2021-10-12 DIAGNOSIS — E78.49 OTHER HYPERLIPIDEMIA: ICD-10-CM

## 2021-10-12 DIAGNOSIS — I10 ESSENTIAL HYPERTENSION: ICD-10-CM

## 2021-10-13 RX ORDER — ATORVASTATIN CALCIUM 80 MG/1
80 TABLET, FILM COATED ORAL
Qty: 30 TABLET | Refills: 5 | Status: SHIPPED | OUTPATIENT
Start: 2021-10-13 | End: 2022-04-27 | Stop reason: SDUPTHER

## 2021-10-13 RX ORDER — AMLODIPINE BESYLATE 5 MG/1
5 TABLET ORAL DAILY
Qty: 30 TABLET | Refills: 5 | Status: SHIPPED | OUTPATIENT
Start: 2021-10-13 | End: 2022-04-27 | Stop reason: SDUPTHER

## 2021-11-01 ENCOUNTER — TELEPHONE (OUTPATIENT)
Dept: PREADMISSION TESTING | Facility: HOSPITAL | Age: 76
End: 2021-11-01

## 2021-11-08 ENCOUNTER — TELEPHONE (OUTPATIENT)
Dept: GASTROENTEROLOGY | Facility: AMBULARY SURGERY CENTER | Age: 76
End: 2021-11-08

## 2021-11-09 ENCOUNTER — HOSPITAL ENCOUNTER (OUTPATIENT)
Dept: GASTROENTEROLOGY | Facility: AMBULARY SURGERY CENTER | Age: 76
Setting detail: OUTPATIENT SURGERY
Discharge: HOME | End: 2021-11-09
Attending: INTERNAL MEDICINE
Payer: MEDICARE

## 2021-11-09 ENCOUNTER — ANESTHESIA EVENT (OUTPATIENT)
Dept: GASTROENTEROLOGY | Facility: AMBULARY SURGERY CENTER | Age: 76
End: 2021-11-09

## 2021-11-09 ENCOUNTER — ANESTHESIA (OUTPATIENT)
Dept: GASTROENTEROLOGY | Facility: AMBULARY SURGERY CENTER | Age: 76
End: 2021-11-09

## 2021-11-09 VITALS
WEIGHT: 190 LBS | BODY MASS INDEX: 27.2 KG/M2 | RESPIRATION RATE: 18 BRPM | OXYGEN SATURATION: 96 % | DIASTOLIC BLOOD PRESSURE: 69 MMHG | SYSTOLIC BLOOD PRESSURE: 126 MMHG | HEIGHT: 70 IN | TEMPERATURE: 97.5 F | HEART RATE: 61 BPM

## 2021-11-09 DIAGNOSIS — R10.13 DYSPEPSIA: ICD-10-CM

## 2021-11-09 DIAGNOSIS — Z86.010 HISTORY OF COLON POLYPS: ICD-10-CM

## 2021-11-09 PROCEDURE — G0105 COLORECTAL SCRN; HI RISK IND: HCPCS | Performed by: INTERNAL MEDICINE

## 2021-11-09 PROCEDURE — 88305 TISSUE EXAM BY PATHOLOGIST: CPT | Performed by: PATHOLOGY

## 2021-11-09 PROCEDURE — 43239 EGD BIOPSY SINGLE/MULTIPLE: CPT | Performed by: INTERNAL MEDICINE

## 2021-11-09 RX ORDER — PROPOFOL 10 MG/ML
INJECTION, EMULSION INTRAVENOUS CONTINUOUS PRN
Status: DISCONTINUED | OUTPATIENT
Start: 2021-11-09 | End: 2021-11-09

## 2021-11-09 RX ORDER — SODIUM CHLORIDE, SODIUM LACTATE, POTASSIUM CHLORIDE, CALCIUM CHLORIDE 600; 310; 30; 20 MG/100ML; MG/100ML; MG/100ML; MG/100ML
75 INJECTION, SOLUTION INTRAVENOUS CONTINUOUS
Status: DISCONTINUED | OUTPATIENT
Start: 2021-11-09 | End: 2021-11-13 | Stop reason: HOSPADM

## 2021-11-09 RX ORDER — LIDOCAINE HYDROCHLORIDE 20 MG/ML
INJECTION, SOLUTION EPIDURAL; INFILTRATION; INTRACAUDAL; PERINEURAL AS NEEDED
Status: DISCONTINUED | OUTPATIENT
Start: 2021-11-09 | End: 2021-11-09

## 2021-11-09 RX ORDER — LIDOCAINE HYDROCHLORIDE 10 MG/ML
INJECTION, SOLUTION EPIDURAL; INFILTRATION; INTRACAUDAL; PERINEURAL AS NEEDED
Status: DISCONTINUED | OUTPATIENT
Start: 2021-11-09 | End: 2021-11-09

## 2021-11-09 RX ORDER — PROPOFOL 10 MG/ML
INJECTION, EMULSION INTRAVENOUS AS NEEDED
Status: DISCONTINUED | OUTPATIENT
Start: 2021-11-09 | End: 2021-11-09

## 2021-11-09 RX ADMIN — SODIUM CHLORIDE, SODIUM LACTATE, POTASSIUM CHLORIDE, AND CALCIUM CHLORIDE 75 ML/HR: .6; .31; .03; .02 INJECTION, SOLUTION INTRAVENOUS at 08:15

## 2021-11-09 RX ADMIN — PROPOFOL 20 MG: 10 INJECTION, EMULSION INTRAVENOUS at 09:05

## 2021-11-09 RX ADMIN — PROPOFOL 20 MG: 10 INJECTION, EMULSION INTRAVENOUS at 09:08

## 2021-11-09 RX ADMIN — PROPOFOL 140 MCG/KG/MIN: 10 INJECTION, EMULSION INTRAVENOUS at 09:08

## 2021-11-09 RX ADMIN — PROPOFOL 100 MG: 10 INJECTION, EMULSION INTRAVENOUS at 09:04

## 2021-11-09 RX ADMIN — LIDOCAINE HYDROCHLORIDE 80 MG: 20 INJECTION, SOLUTION EPIDURAL; INFILTRATION; INTRACAUDAL; PERINEURAL at 09:04

## 2021-12-15 ENCOUNTER — OFFICE VISIT (OUTPATIENT)
Dept: FAMILY MEDICINE CLINIC | Facility: CLINIC | Age: 76
End: 2021-12-15
Payer: MEDICARE

## 2021-12-15 VITALS
DIASTOLIC BLOOD PRESSURE: 70 MMHG | RESPIRATION RATE: 20 BRPM | TEMPERATURE: 96.9 F | OXYGEN SATURATION: 96 % | HEIGHT: 70 IN | BODY MASS INDEX: 27.93 KG/M2 | WEIGHT: 195.1 LBS | SYSTOLIC BLOOD PRESSURE: 130 MMHG | HEART RATE: 99 BPM

## 2021-12-15 DIAGNOSIS — Z00.00 MEDICARE ANNUAL WELLNESS VISIT, SUBSEQUENT: Primary | ICD-10-CM

## 2021-12-15 DIAGNOSIS — E78.00 HYPERCHOLESTEREMIA: ICD-10-CM

## 2021-12-15 DIAGNOSIS — Z11.59 NEED FOR HEPATITIS C SCREENING TEST: ICD-10-CM

## 2021-12-15 DIAGNOSIS — I10 PRIMARY HYPERTENSION: ICD-10-CM

## 2021-12-15 DIAGNOSIS — Z23 ENCOUNTER FOR IMMUNIZATION: ICD-10-CM

## 2021-12-15 PROCEDURE — G0439 PPPS, SUBSEQ VISIT: HCPCS | Performed by: FAMILY MEDICINE

## 2021-12-18 LAB
ALBUMIN SERPL-MCNC: 3.9 G/DL (ref 3.7–4.7)
ALBUMIN/GLOB SERPL: 2.1 {RATIO} (ref 1.2–2.2)
ALP SERPL-CCNC: 73 IU/L (ref 44–121)
ALT SERPL-CCNC: 24 IU/L (ref 0–44)
AST SERPL-CCNC: 29 IU/L (ref 0–40)
BILIRUB SERPL-MCNC: 0.8 MG/DL (ref 0–1.2)
BUN SERPL-MCNC: 16 MG/DL (ref 8–27)
BUN/CREAT SERPL: 19 (ref 10–24)
CALCIUM SERPL-MCNC: 9.2 MG/DL (ref 8.6–10.2)
CHLORIDE SERPL-SCNC: 105 MMOL/L (ref 96–106)
CHOLEST SERPL-MCNC: 143 MG/DL (ref 100–199)
CO2 SERPL-SCNC: 23 MMOL/L (ref 20–29)
CREAT SERPL-MCNC: 0.84 MG/DL (ref 0.76–1.27)
GLOBULIN SER-MCNC: 1.9 G/DL (ref 1.5–4.5)
GLUCOSE SERPL-MCNC: 90 MG/DL (ref 65–99)
HCV AB S/CO SERPL IA: <0.1 S/CO RATIO (ref 0–0.9)
HDLC SERPL-MCNC: 54 MG/DL
LDLC SERPL CALC-MCNC: 75 MG/DL (ref 0–99)
POTASSIUM SERPL-SCNC: 4 MMOL/L (ref 3.5–5.2)
PROT SERPL-MCNC: 5.8 G/DL (ref 6–8.5)
SL AMB EGFR AFRICAN AMERICAN: 97 ML/MIN/1.73
SL AMB EGFR NON AFRICAN AMERICAN: 84 ML/MIN/1.73
SL AMB VLDL CHOLESTEROL CALC: 14 MG/DL (ref 5–40)
SODIUM SERPL-SCNC: 140 MMOL/L (ref 134–144)
TRIGL SERPL-MCNC: 69 MG/DL (ref 0–149)

## 2022-02-01 ENCOUNTER — TELEPHONE (OUTPATIENT)
Dept: GASTROENTEROLOGY | Facility: CLINIC | Age: 77
End: 2022-02-01

## 2022-02-01 NOTE — TELEPHONE ENCOUNTER
Called and spoke with patient and his wife  He did not want to discuss his symptoms, he wanted to set up appt to see Dr Karly Anne  I clarified with patient if he wanted to follow up with Dr Kellie Watkins, who he had seen previously, however patient only wants to be seen by Dr Karly Anne   Follow up appt scheduled, no further questions from patient

## 2022-02-01 NOTE — TELEPHONE ENCOUNTER
Patients GI provider:  Dr Truong Robert    Number to return call: [phone]    Reason for call: Pt calling stating he is experiencing pain below belly button and change in bowel movements      Scheduled procedure/appointment date if applicable: N/A

## 2022-04-27 DIAGNOSIS — I10 ESSENTIAL HYPERTENSION: ICD-10-CM

## 2022-04-27 DIAGNOSIS — E78.49 OTHER HYPERLIPIDEMIA: ICD-10-CM

## 2022-04-27 RX ORDER — ATORVASTATIN CALCIUM 80 MG/1
80 TABLET, FILM COATED ORAL
Qty: 30 TABLET | Refills: 5 | Status: SHIPPED | OUTPATIENT
Start: 2022-04-27

## 2022-04-27 RX ORDER — AMLODIPINE BESYLATE 5 MG/1
5 TABLET ORAL DAILY
Qty: 30 TABLET | Refills: 5 | Status: SHIPPED | OUTPATIENT
Start: 2022-04-27

## 2022-10-17 DIAGNOSIS — E78.49 OTHER HYPERLIPIDEMIA: ICD-10-CM

## 2022-10-17 RX ORDER — ATORVASTATIN CALCIUM 80 MG/1
80 TABLET, FILM COATED ORAL
Qty: 30 TABLET | Refills: 5 | Status: SHIPPED | OUTPATIENT
Start: 2022-10-17

## 2023-04-07 DIAGNOSIS — E78.49 OTHER HYPERLIPIDEMIA: ICD-10-CM

## 2023-04-07 RX ORDER — ATORVASTATIN CALCIUM 80 MG/1
TABLET, FILM COATED ORAL
Qty: 30 TABLET | Refills: 5 | Status: SHIPPED | OUTPATIENT
Start: 2023-04-07

## 2023-04-27 RX ORDER — VALSARTAN 40 MG/1
40 TABLET ORAL DAILY
COMMUNITY
Start: 2022-06-03

## 2023-04-27 RX ORDER — FINASTERIDE 5 MG/1
5 TABLET, FILM COATED ORAL DAILY
COMMUNITY

## 2023-04-27 RX ORDER — CLINDAMYCIN HYDROCHLORIDE 300 MG/1
CAPSULE ORAL
COMMUNITY
Start: 2023-03-09

## 2023-04-27 RX ORDER — CEFADROXIL 500 MG/1
CAPSULE ORAL
COMMUNITY
Start: 2023-04-26

## 2023-04-27 RX ORDER — HYDROCODONE BITARTRATE AND ACETAMINOPHEN 7.5; 325 MG/1; MG/1
TABLET ORAL
COMMUNITY
Start: 2023-04-26

## 2023-04-27 RX ORDER — CARVEDILOL 12.5 MG/1
12.5 TABLET ORAL 2 TIMES DAILY
COMMUNITY
Start: 2022-06-09

## 2023-04-27 RX ORDER — TAMSULOSIN HYDROCHLORIDE 0.4 MG/1
0.4 CAPSULE ORAL DAILY
COMMUNITY

## 2023-04-27 RX ORDER — VALSARTAN 80 MG/1
80 TABLET ORAL DAILY
COMMUNITY
Start: 2023-03-02

## 2023-04-27 RX ORDER — APIXABAN 5 MG/1
5 TABLET, FILM COATED ORAL 2 TIMES DAILY
COMMUNITY
Start: 2023-03-24

## 2023-04-27 RX ORDER — SACUBITRIL AND VALSARTAN 24; 26 MG/1; MG/1
1 TABLET, FILM COATED ORAL 2 TIMES DAILY
COMMUNITY
Start: 2023-04-03

## 2023-04-28 ENCOUNTER — OFFICE VISIT (OUTPATIENT)
Dept: FAMILY MEDICINE CLINIC | Facility: CLINIC | Age: 78
End: 2023-04-28

## 2023-04-28 VITALS
HEIGHT: 69 IN | BODY MASS INDEX: 28.07 KG/M2 | SYSTOLIC BLOOD PRESSURE: 140 MMHG | DIASTOLIC BLOOD PRESSURE: 76 MMHG | WEIGHT: 189.5 LBS | HEART RATE: 80 BPM | OXYGEN SATURATION: 97 % | RESPIRATION RATE: 21 BRPM | TEMPERATURE: 97.8 F

## 2023-04-28 DIAGNOSIS — I48.91 ATRIAL FIBRILLATION, UNSPECIFIED TYPE (HCC): ICD-10-CM

## 2023-04-28 DIAGNOSIS — Z00.00 MEDICARE ANNUAL WELLNESS VISIT, SUBSEQUENT: Primary | ICD-10-CM

## 2023-04-28 NOTE — PROGRESS NOTES
Assessment and Plan:     Problem List Items Addressed This Visit    None       Preventive health issues were discussed with patient, and age appropriate screening tests were ordered as noted in patient's After Visit Summary  Personalized health advice and appropriate referrals for health education or preventive services given if needed, as noted in patient's After Visit Summary       History of Present Illness:     Patient presents for a Medicare Wellness Visit    HPI   Patient Care Team:  Adry Garcia DO as PCP - General     Review of Systems:     Review of Systems     Problem List:     Patient Active Problem List   Diagnosis   • Medicare annual wellness visit, subsequent   • Pneumococcal vaccination given   • Encounter for Medicare annual wellness exam   • Amnesia, global, transient   • HLD (hyperlipidemia)   • Thyroid nodule   • Hypercholesteremia   • Hypertension   • Refused influenza vaccine   • Dyspepsia   • History of colon polyps      Past Medical and Surgical History:     Past Medical History:   Diagnosis Date   • Colon polyp    • High cholesterol    • Hypertension      Past Surgical History:   Procedure Laterality Date   • COLONOSCOPY     • HERNIA REPAIR     • THYROIDECTOMY Left       Family History:     Family History   Problem Relation Age of Onset   • Lymphoma Son         Non-Hodgkin's lymphoma    • Alzheimer's disease Mother    • Dementia Paternal Uncle       Social History:     Social History     Socioeconomic History   • Marital status: /Civil Union     Spouse name: None   • Number of children: None   • Years of education: None   • Highest education level: None   Occupational History   • None   Tobacco Use   • Smoking status: Never   • Smokeless tobacco: Never   Vaping Use   • Vaping Use: Never used   Substance and Sexual Activity   • Alcohol use: Yes     Comment: occ   • Drug use: Never   • Sexual activity: None   Other Topics Concern   • None   Social History Narrative    Lives with spouse      Social Determinants of Health     Financial Resource Strain: Not on file   Food Insecurity: Not on file   Transportation Needs: Not on file   Physical Activity: Not on file   Stress: Not on file   Social Connections: Not on file   Intimate Partner Violence: Not on file   Housing Stability: Not on file      Medications and Allergies:     Current Outpatient Medications   Medication Sig Dispense Refill   • amLODIPine (NORVASC) 5 mg tablet Take 1 tablet (5 mg total) by mouth daily 30 tablet 5   • apixaban (ELIQUIS) 2 5 mg Take 2 5 mg by mouth 2 (two) times a day     • atorvastatin (LIPITOR) 80 mg tablet Take 1 tablet (80 mg total) by mouth daily with dinner 30 tablet 5   • carvedilol (COREG) 12 5 mg tablet Take 12 5 mg by mouth 2 (two) times a day     • cefadroxil (DURICEF) 500 mg capsule take 1 capsule by mouth twice a day until finished     • clindamycin (CLEOCIN) 300 MG capsule take 1 capsule by mouth four times a day until finished     • Eliquis 5 MG Take 5 mg by mouth 2 (two) times a day     • Entresto 24-26 MG TABS Take 1 tablet by mouth 2 (two) times a day     • finasteride (PROSCAR) 5 mg tablet Take 5 mg by mouth daily     • HYDROcodone-acetaminophen (NORCO) 7 5-325 mg per tablet take 1 tablet by mouth every 4 to 6 hours if needed for pain DO NOT DRIVE     • Na Sulfate-K Sulfate-Mg Sulf (Suprep Bowel Prep Kit) 17 5-3 13-1 6 GM/177ML SOLN Take 2 Bottles by mouth see administration instructions Please follow the instructions from the office (Patient not taking: Reported on 12/15/2021 ) 177 mL 0   • SACUBITRIL-VALSARTAN PO Take by mouth     • tamsulosin (FLOMAX) 0 4 mg Take 0 4 mg by mouth daily     • valsartan (DIOVAN) 40 mg tablet Take 40 mg by mouth daily     • valsartan (DIOVAN) 80 mg tablet Take 80 mg by mouth daily       No current facility-administered medications for this visit       No Known Allergies   Immunizations:     Immunization History   Administered Date(s) Administered   • COVID-19 PFIZER VACCINE 0 3 ML IM 02/04/2021, 02/27/2021   • Influenza Quadrivalent Preservative Free 3 years and older IM 11/10/2015   • Pneumococcal Conjugate 13-Valent 11/10/2015   • Pneumococcal Polysaccharide PPV23 05/13/2019   • Tdap 01/27/2021      Health Maintenance:         Topic Date Due   • Hepatitis C Screening  Completed         Topic Date Due   • COVID-19 Vaccine (3 - Booster for Pfizer series) 04/24/2021   • Influenza Vaccine (1) 09/01/2022      Medicare Screening Tests and Risk Assessments:         Health Risk Assessment:   Patient rates overall health as good  Patient feels that their physical health rating is same  Patient is satisfied with their life  Eyesight was rated as same  Hearing was rated as same  Patient feels that their emotional and mental health rating is same  Patients states they are never, rarely angry  Patient states they are sometimes unusually tired/fatigued  Pain experienced in the last 7 days has been some  Patient's pain rating has been 3/10  Patient states that he has experienced no weight loss or gain in last 6 months  Fall Risk Screening: In the past year, patient has experienced: history of falling in past year    Number of falls: 2 or more  Injured during fall?: No    Feels unsteady when standing or walking?: No    Worried about falling?: No      Home Safety:  Patient has trouble with stairs inside or outside of their home  Patient has working smoke alarms and has no working carbon monoxide detector  Home safety hazards include: none  Nutrition:   Current diet is Regular  Medications:   Patient is currently taking over-the-counter supplements  OTC medications include: see medication list  Patient is able to manage medications  Activities of Daily Living (ADLs)/Instrumental Activities of Daily Living (IADLs):   Walk and transfer into and out of bed and chair?: Yes  Dress and groom yourself?: Yes    Bathe or shower yourself?: Yes    Feed yourself?  Yes  Do your "laundry/housekeeping?: Yes  Manage your money, pay your bills and track your expenses?: Yes  Make your own meals?: Yes    Do your own shopping?: Yes    Previous Hospitalizations:   Any hospitalizations or ED visits within the last 12 months?: Yes    How many hospitalizations have you had in the last year?: 1-2    Advance Care Planning:   Living will: Yes    Advanced directive: Yes      PREVENTIVE SCREENINGS      Cardiovascular Screening:    General: Screening Not Indicated and History Lipid Disorder      Prostate Cancer Screening:    General: Screening Not Indicated      Lung Cancer Screening:     General: Screening Not Indicated      Hepatitis C Screening:    General: Screening Current    Screening, Brief Intervention, and Referral to Treatment (SBIRT)    Screening  Typical number of drinks in a day: 0  Typical number of drinks in a week: 1  Interpretation: Low risk drinking behavior  Single Item Drug Screening:  How often have you used an illegal drug (including marijuana) or a prescription medication for non-medical reasons in the past year? never    Single Item Drug Screen Score: 0  Interpretation: Negative screen for possible drug use disorder    No results found       Physical Exam:     /76 (BP Location: Left arm, Patient Position: Sitting, Cuff Size: Standard)   Pulse 80   Temp 97 8 °F (36 6 °C) (Temporal)   Resp 21   Ht 5' 9\" (1 753 m)   Wt 86 kg (189 lb 8 oz)   SpO2 97%   BMI 27 98 kg/m²     Physical Exam     Eduardo Tin, DO  "

## 2023-04-28 NOTE — PROGRESS NOTES
Name: Ronnie Amado      : 1945      MRN: [id number]  Encounter Provider: Marianna Tom DO  Encounter Date: 2023   Encounter department: [address] [place] [address] & Plan   Annual Medicare wellness exam-subsequent patient denies any problems with falls, depression, or urinary incontinence  He does have a advanced directive on file  He is up-to-date with his immunizations  He will continue to follow-up with his cardiologist for his newly diagnosed atrial fibrillation since his last Medicare wellness exam last year  Subjective      This is a scheduled Medicare wellness exam for this 69-year-old male  Since his last Medicare wellness exam the patient was diagnosed with atrial fibrillation and has been seeing a cardiologist for this problem  He is on amlodipine, Eliquis, and an Estroven since his diagnosis of atrial fibrillation  The patient denies any problems with chest pain or shortness of breath today  Review of Systems   HENT: Negative  Eyes: Negative  Respiratory: Negative  Cardiovascular: Negative  Gastrointestinal: Negative  Endocrine: Negative  Musculoskeletal: Negative  Allergic/Immunologic: Negative  Neurological: Negative  Hematological: Negative  Psychiatric/Behavioral: Negative  All other systems reviewed and are negative        Current Outpatient Medications on File Prior to Visit   Medication Sig   • amLODIPine (NORVASC) 5 mg tablet Take 1 tablet (5 mg total) by mouth daily   • apixaban (ELIQUIS) 2 5 mg Take 2 5 mg by mouth 2 (two) times a day   • atorvastatin (LIPITOR) 80 mg tablet Take 1 tablet (80 mg total) by mouth daily with dinner   • carvedilol (COREG) 12 5 mg tablet Take 12 5 mg by mouth 2 (two) times a day   • cefadroxil (DURICEF) 500 mg capsule take 1 capsule by mouth twice a day until finished   • clindamycin (CLEOCIN) 300 MG capsule take 1 capsule by mouth four times a day until finished   • Eliquis 5 MG Take 5 mg by "mouth 2 (two) times a day   • Entresto 24-26 MG TABS Take 1 tablet by mouth 2 (two) times a day   • finasteride (PROSCAR) 5 mg tablet Take 5 mg by mouth daily   • HYDROcodone-acetaminophen (NORCO) 7 5-325 mg per tablet take 1 tablet by mouth every 4 to 6 hours if needed for pain DO NOT DRIVE   • Na Sulfate-K Sulfate-Mg Sulf (Suprep Bowel Prep Kit) 17 5-3 13-1 6 GM/177ML SOLN Take 2 Bottles by mouth see administration instructions Please follow the instructions from the office (Patient not taking: Reported on 12/15/2021 )   • SACUBITRIL-VALSARTAN PO Take by mouth   • tamsulosin (FLOMAX) 0 4 mg Take 0 4 mg by mouth daily   • valsartan (DIOVAN) 40 mg tablet Take 40 mg by mouth daily   • valsartan (DIOVAN) 80 mg tablet Take 80 mg by mouth daily       Objective     /76 (BP Location: Left arm, Patient Position: Sitting, Cuff Size: Standard)   Pulse 80   Temp 97 8 °F (36 6 °C) (Temporal)   Resp 21   Ht 5' 9\" (1 753 m)   Wt 86 kg (189 lb 8 oz)   SpO2 97%   BMI 27 98 kg/m²     Physical Exam  Constitutional:       Comments: The patient is overweight with a BMI 27 98   Cardiovascular:      Rate and Rhythm: Regular rhythm  Heart sounds: Normal heart sounds  Pulmonary:      Effort: Pulmonary effort is normal       Breath sounds: Normal breath sounds  Musculoskeletal:         General: Normal range of motion  Neurological:      General: No focal deficit present  Mental Status: He is alert and oriented to person, place, and time  Mental status is at baseline  Psychiatric:         Mood and Affect: Mood normal          Behavior: Behavior normal          Thought Content:  Thought content normal          Judgment: Judgment normal        Reilly Velazquez DO  "

## 2023-04-28 NOTE — PATIENT INSTRUCTIONS
The patient will schedule a yearly Medicare wellness exam   He will continue on the present medication to treat his atrial fibrillation and follow-up with his cardiologist   The patient does not need any blood work at this time  The patient does have a follow-up appointment with a urologist for potential benign prostatic hypertrophy  The urologist may want to order a PSA level for this patient because of the benign prostatic hypertrophy diagnosis

## 2023-08-31 DIAGNOSIS — M25.561 PAIN IN BOTH KNEES, UNSPECIFIED CHRONICITY: Primary | ICD-10-CM

## 2023-08-31 DIAGNOSIS — M25.562 PAIN IN BOTH KNEES, UNSPECIFIED CHRONICITY: Primary | ICD-10-CM

## 2023-09-12 ENCOUNTER — OFFICE VISIT (OUTPATIENT)
Dept: OBGYN CLINIC | Facility: HOSPITAL | Age: 78
End: 2023-09-12
Payer: MEDICARE

## 2023-09-12 ENCOUNTER — HOSPITAL ENCOUNTER (OUTPATIENT)
Dept: RADIOLOGY | Facility: HOSPITAL | Age: 78
Discharge: HOME | End: 2023-09-12
Attending: ORTHOPAEDIC SURGERY
Payer: MEDICARE

## 2023-09-12 VITALS
HEART RATE: 45 BPM | BODY MASS INDEX: 26.48 KG/M2 | WEIGHT: 185 LBS | DIASTOLIC BLOOD PRESSURE: 68 MMHG | SYSTOLIC BLOOD PRESSURE: 180 MMHG | HEIGHT: 70 IN

## 2023-09-12 DIAGNOSIS — M17.12 PRIMARY OSTEOARTHRITIS OF LEFT KNEE: ICD-10-CM

## 2023-09-12 DIAGNOSIS — M25.561 PAIN IN BOTH KNEES, UNSPECIFIED CHRONICITY: ICD-10-CM

## 2023-09-12 DIAGNOSIS — M25.562 PAIN IN BOTH KNEES, UNSPECIFIED CHRONICITY: ICD-10-CM

## 2023-09-12 DIAGNOSIS — M25.561 CHRONIC PAIN OF RIGHT KNEE: ICD-10-CM

## 2023-09-12 DIAGNOSIS — G89.29 CHRONIC PAIN OF LEFT KNEE: Primary | ICD-10-CM

## 2023-09-12 DIAGNOSIS — M17.11 PRIMARY OSTEOARTHRITIS OF RIGHT KNEE: ICD-10-CM

## 2023-09-12 DIAGNOSIS — G89.29 CHRONIC PAIN OF RIGHT KNEE: ICD-10-CM

## 2023-09-12 DIAGNOSIS — M25.562 CHRONIC PAIN OF LEFT KNEE: Primary | ICD-10-CM

## 2023-09-12 PROCEDURE — 20610 DRAIN/INJ JOINT/BURSA W/O US: CPT | Performed by: ORTHOPAEDIC SURGERY

## 2023-09-12 PROCEDURE — 99203 OFFICE O/P NEW LOW 30 MIN: CPT | Performed by: ORTHOPAEDIC SURGERY

## 2023-09-12 PROCEDURE — 73562 X-RAY EXAM OF KNEE 3: CPT

## 2023-09-12 RX ORDER — AMIODARONE HYDROCHLORIDE 200 MG/1
200 TABLET ORAL DAILY
COMMUNITY
Start: 2023-07-07

## 2023-09-12 RX ORDER — BETAMETHASONE SODIUM PHOSPHATE AND BETAMETHASONE ACETATE 3; 3 MG/ML; MG/ML
12 INJECTION, SUSPENSION INTRA-ARTICULAR; INTRALESIONAL; INTRAMUSCULAR; SOFT TISSUE
Status: COMPLETED | OUTPATIENT
Start: 2023-09-12 | End: 2023-09-12

## 2023-09-12 RX ORDER — BUPIVACAINE HYDROCHLORIDE 2.5 MG/ML
2 INJECTION, SOLUTION INFILTRATION; PERINEURAL
Status: COMPLETED | OUTPATIENT
Start: 2023-09-12 | End: 2023-09-12

## 2023-09-12 RX ADMIN — BETAMETHASONE SODIUM PHOSPHATE AND BETAMETHASONE ACETATE 12 MG: 3; 3 INJECTION, SUSPENSION INTRA-ARTICULAR; INTRALESIONAL; INTRAMUSCULAR; SOFT TISSUE at 09:30

## 2023-09-12 RX ADMIN — BUPIVACAINE HYDROCHLORIDE 2 ML: 2.5 INJECTION, SOLUTION INFILTRATION; PERINEURAL at 09:30

## 2023-09-12 NOTE — PROGRESS NOTES
78 y.o.male presents for evaluation of bilateral knee pain. Atraumatic onset gradual getting worse. He describes pain level of both knee joints, the pain is made worse bearing weight, the pain increases with increased activities.   Pain score rates 7 out of 10 in both knees with regularity utilizing a 10 point pain score    Review of Systems  Review of systems negative unless otherwise specified in HPI    Past Medical History  Past Medical History:   Diagnosis Date   • Colon polyp    • High cholesterol    • Hypertension        Past Surgical History  Past Surgical History:   Procedure Laterality Date   • COLONOSCOPY     • HERNIA REPAIR     • THYROIDECTOMY Left        Current Medications  Current Outpatient Medications on File Prior to Visit   Medication Sig Dispense Refill   • amiodarone 200 mg tablet Take 200 mg by mouth daily     • amLODIPine (NORVASC) 5 mg tablet Take 1 tablet (5 mg total) by mouth daily 30 tablet 5   • apixaban (ELIQUIS) 2.5 mg Take 2.5 mg by mouth 2 (two) times a day     • atorvastatin (LIPITOR) 80 mg tablet Take 1 tablet (80 mg total) by mouth daily with dinner 30 tablet 5   • carvedilol (COREG) 12.5 mg tablet Take 12.5 mg by mouth 2 (two) times a day     • cefadroxil (DURICEF) 500 mg capsule take 1 capsule by mouth twice a day until finished     • clindamycin (CLEOCIN) 300 MG capsule take 1 capsule by mouth four times a day until finished     • Eliquis 5 MG Take 5 mg by mouth 2 (two) times a day     • Entresto 24-26 MG TABS Take 1 tablet by mouth 2 (two) times a day     • finasteride (PROSCAR) 5 mg tablet Take 5 mg by mouth daily     • HYDROcodone-acetaminophen (NORCO) 7.5-325 mg per tablet take 1 tablet by mouth every 4 to 6 hours if needed for pain DO NOT DRIVE     • Na Sulfate-K Sulfate-Mg Sulf (Suprep Bowel Prep Kit) 17.5-3.13-1.6 GM/177ML SOLN Take 2 Bottles by mouth see administration instructions Please follow the instructions from the office (Patient not taking: Reported on 12/15/2021 ) 177 mL 0   • SACUBITRIL-VALSARTAN PO Take by mouth     • tamsulosin (FLOMAX) 0.4 mg Take 0.4 mg by mouth daily     • valsartan (DIOVAN) 40 mg tablet Take 40 mg by mouth daily     • valsartan (DIOVAN) 80 mg tablet Take 80 mg by mouth daily       No current facility-administered medications on file prior to visit. Recent Labs [facility])  0   Lab Value Date/Time    HCT 37.9 09/15/2020 0557    HGB 12.9 09/15/2020 0557    WBC 6.26 09/15/2020 0557    GLUCOSE 95 12/08/2016 0936    HGBA1C 5.6 09/15/2020 0557         Physical exam  · General: Awake, Alert, Oriented  · Eyes: Pupils equal, round and reactive to light  · Heart: regular rate and rhythm  · Lungs: No audible wheezing  · Abdomen: soft  Examination finds a gait pattern without antalgia. Neither hip is motionless. Neither thighs atrophy. Both knees are varus. Neither knee is effusion. Each knee has intact soft tissue envelope. Each knee has flexion greater than 90 degrees, left knee has a 5 degree flexion traction, where the right extends fully. Each knee has bony enlargement tenderness medially. Each he has crepitation flexion-extension. Neither knee is palp warmth of the synovium    Imaging  I have personally reviewed standing x-rays of both knees and my interpretation follows:  Tricompartmental degenerative joint disease is seen with lateral subluxation of the tibia bilaterally    Procedure  Ejections of corticosteroid are provided for bilateral knee joints.   They are documented below        Large joint arthrocentesis: R knee  Universal Protocol:  Consent given by: patient    Supporting Documentation  Indications: pain   Procedure Details  Location: knee - R knee  Needle size: 22 G  Ultrasound guidance: no  Medications administered: 2 mL bupivacaine 0.25 %; 12 mg betamethasone acetate-betamethasone sodium phosphate 6 (3-3) mg/mL    Patient tolerance: patient tolerated the procedure well with no immediate complications  Dressing:  Sterile dressing applied    Large joint arthrocentesis: L knee  Universal Protocol:  Consent given by: patient    Supporting Documentation  Indications: pain   Procedure Details  Location: knee - L knee  Needle size: 22 G  Medications administered: 2 mL bupivacaine 0.25 %; 12 mg betamethasone acetate-betamethasone sodium phosphate 6 (3-3) mg/mL    Patient tolerance: patient tolerated the procedure well with no immediate complications  Dressing:  Sterile dressing applied            Assessment/Plan:   78 y.o.male osteoarthritis of both knees who has pain and dysfunction. All diagnoses were discussed with the patient. Treatment option clued risk, benefits, return discussed in detail. Injection of corticosteroid indicated for paly purposes. They are advised, accepted, administer as outlined above.   I supervised program of physical therapy would be beneficial as well, and office follow-up on an as-needed basis is my final recommendation

## 2023-10-02 DIAGNOSIS — E78.49 OTHER HYPERLIPIDEMIA: ICD-10-CM

## 2023-10-02 RX ORDER — ATORVASTATIN CALCIUM 80 MG/1
80 TABLET, FILM COATED ORAL
Qty: 30 TABLET | Refills: 5 | Status: SHIPPED | OUTPATIENT
Start: 2023-10-02

## 2023-10-20 ENCOUNTER — RA CDI HCC (OUTPATIENT)
Dept: OTHER | Facility: HOSPITAL | Age: 78
End: 2023-10-20

## 2023-10-27 ENCOUNTER — OFFICE VISIT (OUTPATIENT)
Dept: FAMILY MEDICINE CLINIC | Facility: CLINIC | Age: 78
End: 2023-10-27
Payer: MEDICARE

## 2023-10-27 VITALS
OXYGEN SATURATION: 98 % | DIASTOLIC BLOOD PRESSURE: 71 MMHG | WEIGHT: 185 LBS | RESPIRATION RATE: 16 BRPM | HEART RATE: 52 BPM | BODY MASS INDEX: 26.54 KG/M2 | SYSTOLIC BLOOD PRESSURE: 165 MMHG

## 2023-10-27 DIAGNOSIS — R10.13 DYSPEPSIA: ICD-10-CM

## 2023-10-27 DIAGNOSIS — I10 PRIMARY HYPERTENSION: ICD-10-CM

## 2023-10-27 DIAGNOSIS — E78.2 MIXED HYPERLIPIDEMIA: ICD-10-CM

## 2023-10-27 DIAGNOSIS — R00.1 BRADYCARDIA: Primary | ICD-10-CM

## 2023-10-27 PROCEDURE — 99214 OFFICE O/P EST MOD 30 MIN: CPT | Performed by: FAMILY MEDICINE

## 2023-10-27 RX ORDER — AMLODIPINE BESYLATE 2.5 MG/1
2.5 TABLET ORAL DAILY
Qty: 30 TABLET | Refills: 5 | Status: SHIPPED | OUTPATIENT
Start: 2023-10-27

## 2023-10-28 NOTE — PROGRESS NOTES
Assessment/Plan:    Bradycardia. The bradycardia could be caused by the beta-blocker use. It also could be caused by undiagnosed hypothyroidism. The patient will continue his present Coreg 12.5 mg twice daily with close monitoring of his heart rate. A thyroid panel was also ordered to rule out undiagnosed hypothyroidism. Hypertension. The patient's blood pressure was elevated today and Norvasc 2.5 mg daily was added to his Norvasc 5 mg daily. Hyperlipidemia history. The patient was given an order for lipid panel. History of atrial fibrillation. The patient will continue on Eliquis as prescribed by his cardiologist.  The patient should keep his follow-up appointment with his cardiologist in December. The patient will continue on his Coreg 12.5 mg daily as prescribed by his cardiologist.           Subjective:      Patient ID: Dragan Lo is a 78 y.o. male. This is a follow-up appointment for this 68-year-old male with past medical history of hypertension, paroxysmal atrial fibrillation, osteoarthritis of his knees bilaterally, and hyperlipidemia. The patient states recent complaints of short acting intermittent nausea which goes away with burping. The symptoms last several seconds but are relieved with burping. The patient does not recall any association with food. The patient at times also complains of some lightheadedness when he gets up. He is noted that his heart rate has been in the 40-50 range. The patient is on a beta-blocker from his cardiologist because of his history of atrial fibrillation. The patient is also on amiodarone for the atrial fibrillation. Thyroid functions have been checked for the patient at the last order for blood work on his thyroid was approximately a year and a half ago. The patient does have a history of a thyroid nodule. He also has been diagnosed with dyspepsia in the past after seeing his GI physician. Review of Systems   Constitutional: Negative. Respiratory: Negative. Cardiovascular: Negative. Gastrointestinal:  Positive for nausea. Musculoskeletal:  Positive for arthralgias. Neurological:  Positive for dizziness. Psychiatric/Behavioral: Negative. Objective:      /71   Pulse (!) 52   Resp 16   Wt 83.9 kg (185 lb)   SpO2 98%   BMI 26.54 kg/m²          Physical Exam  Constitutional:       Comments: The patient is overweight with a BMI of 26.54   Cardiovascular:      Rate and Rhythm: Regular rhythm. Bradycardia present. Pulmonary:      Effort: Pulmonary effort is normal.      Breath sounds: Normal breath sounds. Musculoskeletal:         General: Normal range of motion. Neurological:      General: No focal deficit present. Mental Status: He is alert. Psychiatric:         Mood and Affect: Mood normal.         Behavior: Behavior normal.         Thought Content:  Thought content normal.         Judgment: Judgment normal.

## 2023-10-28 NOTE — PATIENT INSTRUCTIONS
The patient's blood pressure was elevated today in spite of being on Norvasc 5 mg daily. He is no longer taking Diovan for blood pressure control. He is also on Coreg 12.5 mg twice daily as prescribed by his cardiologist to treat his history of atrial fibrillation. The patient also is on amiodarone to treat his history of atrial fibrillation. Note the patient was cardioverted in August because of the atrial fibrillation. The patient is on Eliquis because of the history of atrial fibrillation. The patient reports some bruising because of the Eliquis use. The patient does have a follow-up appointment with his cardiologist in December and is hopeful to get off the Eliquis. The patient has a past history of dyspepsia. His complaints were nonspecific. The patient was advised to do a diet diary to see if his food or liquid intake has anything to do with his dyspepsia symptoms. The patient does have a past history of hyperlipidemia and he was given an order for lipid panel. Secondary to his use of amiodarone he was also given an order for thyroid panel. The patient is bradycardic and it could be related to the beta-blocker use or potential undiagnosed hypothyroidism. His blood pressure was elevated today and I added Norvasc 2.5 mg daily to his past Norvasc 5 mg daily. His blood pressure will be checked by his cardiologist in his December appointment.

## 2023-10-30 LAB
CHOLEST SERPL-MCNC: 135 MG/DL (ref 100–199)
HDLC SERPL-MCNC: 58 MG/DL
LDLC SERPL CALC-MCNC: 62 MG/DL (ref 0–99)
SL AMB VLDL CHOLESTEROL CALC: 15 MG/DL (ref 5–40)
TRIGL SERPL-MCNC: 73 MG/DL (ref 0–149)
TSH SERPL DL<=0.005 MIU/L-ACNC: 4.31 UIU/ML (ref 0.45–4.5)

## 2023-11-01 ENCOUNTER — TELEPHONE (OUTPATIENT)
Dept: FAMILY MEDICINE CLINIC | Facility: CLINIC | Age: 78
End: 2023-11-01

## 2023-11-01 NOTE — TELEPHONE ENCOUNTER
Patient is requesting a call back with labs results done 10/30/23. He would also like to confirm Amlodipine dosing. States he is currently taking 2.5mg and it had been discussed for him to be taking 5mg daily.

## 2023-11-03 NOTE — TELEPHONE ENCOUNTER
Vm on clinical line:     My name is Elmer Kirkland, phone number is [phone] Birthday [birth year]. I had a question, did I called in yesterday to Doctor Jose Juan Prince or four Doctor Jose Juan Prince about my Amlodipine dosage and I haven't heard back yet. So I would like a some kind of reply call [phone]. Thank you. Dr Jose Juan Prince sent [employer] message this morning.

## 2023-11-12 ENCOUNTER — TELEPHONE (OUTPATIENT)
Dept: OTHER | Facility: OTHER | Age: 78
End: 2023-11-12

## 2023-11-13 ENCOUNTER — TELEPHONE (OUTPATIENT)
Dept: FAMILY MEDICINE CLINIC | Facility: CLINIC | Age: 78
End: 2023-11-13

## 2023-11-13 NOTE — TELEPHONE ENCOUNTER
----- Message from Darryle Roberts, DO sent at 2023  7:15 PM EST -----  Hello,     Can you please call this patient Geremias Hernandez TREMAYNE 1945) and schedule a follow up visit asap? He was seen in [facility] ED on 23 for lightheadedness, dizziness, had a fall without loss of consciousness. He declined blood work and EKG in the ED and wanted to go home. He told the ED doctor there that he had a follow up with [facility] in the next 2 weeks but I did not see any upcoming appointments.  He was recently seen in office by PCP Dr. Ken Calderón    Thank you!     - Dr. Ryan Guerrero

## 2023-11-17 ENCOUNTER — OFFICE VISIT (OUTPATIENT)
Dept: FAMILY MEDICINE CLINIC | Facility: CLINIC | Age: 78
End: 2023-11-17
Payer: MEDICARE

## 2023-11-17 VITALS
DIASTOLIC BLOOD PRESSURE: 68 MMHG | OXYGEN SATURATION: 97 % | HEART RATE: 69 BPM | WEIGHT: 186 LBS | HEIGHT: 70 IN | BODY MASS INDEX: 26.63 KG/M2 | SYSTOLIC BLOOD PRESSURE: 132 MMHG

## 2023-11-17 DIAGNOSIS — Z86.79 HISTORY OF ATRIAL FIBRILLATION: ICD-10-CM

## 2023-11-17 DIAGNOSIS — I10 PRIMARY HYPERTENSION: Primary | ICD-10-CM

## 2023-11-17 DIAGNOSIS — R00.1 SINUS BRADYCARDIA: ICD-10-CM

## 2023-11-17 PROCEDURE — 99214 OFFICE O/P EST MOD 30 MIN: CPT | Performed by: FAMILY MEDICINE

## 2023-11-17 NOTE — PROGRESS NOTES
Assessment/Plan:    No problem-specific Assessment & Plan notes found for this encounter. Subjective:      Patient ID: Milton Torrez is a 78 y.o. male. This is a follow-up appointment for this 78-year-old male who in the recent past was diagnosed with atrial fibrillation and subsequently cardioverted. The patient still continues on amiodarone and also a beta-blocker as prescribed by his cardiologist.  The patient sustained a fall approximately 1 week ago after becoming lightheaded and dizzy. The patient was seen in the emergency room and was negative for any fractures. The patient did have a bruised right eye secondary to the fall. The patient's heart rate while in the emergency room was in the 40s. The patient subsequently followed up with his cardiologist and his beta-blocker dosage was adjusted down because of the bradycardic heart rate. The patient was also seen by me several weeks ago and had elevated blood pressure. At that time I added Norvasc 2.5 mg to his blood pressure regimen. The patient was on Norvasc 5 mg at that point in time but now reports that he was not taking this dosage. The patient has subsequently taken either 2.5 or 5 mg of Norvasc at different times of the week. Today's blood pressure was controlled but the patient states he stopped taking Norvasc several days ago. The patient has been taking Coreg 6.25 mg twice daily since seeing his cardiologist 3 days ago. The patient denies any chest pain or shortness of breath. He denies any other problems. Review of Systems   HENT: Negative. Eyes: Negative. Respiratory: Negative. Cardiovascular: Negative. Gastrointestinal: Negative. Endocrine: Negative. Musculoskeletal: Negative. Allergic/Immunologic: Negative. Neurological: Negative. Hematological: Negative. Psychiatric/Behavioral: Negative. All other systems reviewed and are negative.         Objective:      /68 (BP Location: Left arm, Patient Position: Sitting, Cuff Size: Standard)   Pulse 69   Ht 5' 10" (1.778 m)   Wt 84.4 kg (186 lb)   SpO2 97%   BMI 26.69 kg/m²          Physical Exam  Constitutional:       Appearance: Normal appearance. Comments: The patient has bruising around his right eye secondary to a fall   Cardiovascular:      Rate and Rhythm: Regular rhythm. Bradycardia present. Heart sounds: Normal heart sounds. Pulmonary:      Effort: Pulmonary effort is normal.      Breath sounds: Normal breath sounds. Musculoskeletal:         General: Normal range of motion. Neurological:      General: No focal deficit present. Mental Status: He is alert. Mental status is at baseline. Psychiatric:         Mood and Affect: Mood normal.         Behavior: Behavior normal.         Thought Content:  Thought content normal.         Judgment: Judgment normal.

## 2023-11-17 NOTE — PATIENT INSTRUCTIONS
This patient's blood pressure is controlled today with out either dose of Norvasc. The patient did take Coreg 6.25 mg twice daily for several days. The patient's heart rate today on repeat was 49. The patient's beta-blocker was decreased to 6.25 mg of Coreg once a day. The patient does have a follow-up appointment with his cardiologist on November 29 where his heart rate and blood pressure can be repeated. The patient also has a repeat echocardiogram scheduled after seeing his cardiologist.  The patient should continue his amiodarone and Eliquis as prescribed by his cardiologist.  Note the patient is in sinus bradycardic rhythm today and not atrial fibrillation. The patient's cardiologist may have potentially stop the amiodarone with his follow-up appointment. He may also stop the beta-blocker or reduce the dose with a follow-up appointment. The patient should follow-up with me in 6 months to check his blood pressure and heart rate.

## 2023-12-01 ENCOUNTER — TELEPHONE (OUTPATIENT)
Dept: FAMILY MEDICINE CLINIC | Facility: CLINIC | Age: 78
End: 2023-12-01

## 2023-12-01 NOTE — TELEPHONE ENCOUNTER
HIREN left on clinical line:    My name is Serina Gomez, birthday April 1st, 1945. I've got a case of COVID I'm trying to fight off. I'm looking for a dosage of [place] like normal primary physician. My normal physician is Doctor Ken Calderón, so if you have to get all of them, that's it. Let me give you my wife's mobile number. Area code [id number] 1/6/09 Thank you. Dr. Ken Calderón, please call patient when you can. Thank you!

## 2024-02-21 PROBLEM — Z00.00 MEDICARE ANNUAL WELLNESS VISIT, SUBSEQUENT: Status: RESOLVED | Noted: 2019-05-13 | Resolved: 2024-02-21

## 2024-02-21 PROBLEM — Z00.00 ENCOUNTER FOR MEDICARE ANNUAL WELLNESS EXAM: Status: RESOLVED | Noted: 2019-05-13 | Resolved: 2024-02-21

## 2024-03-05 ENCOUNTER — TELEPHONE (OUTPATIENT)
Age: 79
End: 2024-03-05

## 2024-03-05 DIAGNOSIS — E04.1 THYROID NODULE: Primary | ICD-10-CM

## 2024-03-05 NOTE — TELEPHONE ENCOUNTER
Patient is requesting updated Lab order for TSH. Patient would like a call once labs are placed in his chart.

## 2024-03-07 LAB — TSH SERPL DL<=0.005 MIU/L-ACNC: 3.36 UIU/ML (ref 0.45–4.5)

## 2024-03-25 DIAGNOSIS — E78.49 OTHER HYPERLIPIDEMIA: ICD-10-CM

## 2024-03-25 RX ORDER — ATORVASTATIN CALCIUM 80 MG/1
80 TABLET, FILM COATED ORAL
Qty: 30 TABLET | Refills: 5 | Status: SHIPPED | OUTPATIENT
Start: 2024-03-25

## 2024-09-06 ENCOUNTER — TELEPHONE (OUTPATIENT)
Age: 79
End: 2024-09-06

## 2024-09-06 NOTE — TELEPHONE ENCOUNTER
Patient called again for status from pcp, he is requesting a call from pcp. I adv I will route message. Please Review, Thank you

## 2024-09-06 NOTE — TELEPHONE ENCOUNTER
Patient requesting a muscle relaxer for the pain in the muscle under his left arm. He uses [facility] in [place].      Patient can be reached at: [phone]

## 2024-09-08 ENCOUNTER — HOSPITAL ENCOUNTER (EMERGENCY)
Facility: HOSPITAL | Age: 79
Discharge: HOME | End: 2024-09-08
Attending: EMERGENCY MEDICINE | Admitting: EMERGENCY MEDICINE
Payer: MEDICARE

## 2024-09-08 ENCOUNTER — APPOINTMENT (EMERGENCY)
Dept: RADIOLOGY | Facility: HOSPITAL | Age: 79
End: 2024-09-08
Payer: MEDICARE

## 2024-09-08 VITALS
SYSTOLIC BLOOD PRESSURE: 138 MMHG | WEIGHT: 185 LBS | DIASTOLIC BLOOD PRESSURE: 63 MMHG | TEMPERATURE: 97.8 F | RESPIRATION RATE: 18 BRPM | OXYGEN SATURATION: 96 % | BODY MASS INDEX: 26.54 KG/M2 | HEART RATE: 45 BPM

## 2024-09-08 DIAGNOSIS — S29.011A MUSCLE STRAIN OF CHEST WALL, INITIAL ENCOUNTER: Primary | ICD-10-CM

## 2024-09-08 PROCEDURE — 93005 ELECTROCARDIOGRAM TRACING: CPT

## 2024-09-08 PROCEDURE — 99283 EMERGENCY DEPT VISIT LOW MDM: CPT

## 2024-09-08 PROCEDURE — 71046 X-RAY EXAM CHEST 2 VIEWS: CPT

## 2024-09-08 PROCEDURE — 99284 EMERGENCY DEPT VISIT MOD MDM: CPT | Performed by: EMERGENCY MEDICINE

## 2024-09-08 RX ORDER — CYCLOBENZAPRINE HCL 10 MG
10 TABLET ORAL 2 TIMES DAILY PRN
Qty: 20 TABLET | Refills: 0 | Status: SHIPPED | OUTPATIENT
Start: 2024-09-08

## 2024-09-08 NOTE — ED PROVIDER NOTES
History  Chief Complaint   Patient presents with    Rib Pain     Pt states reached down to  a log and it was heavier than he thought, felt a pull in left side of ribs. Happened Tuesday, pain is getting worse. Increases with movement and deep breath     79-year-old male, presents with pain in left lateral chest wall.  Patient states 4 days ago he was working in the yard, went to  a branch that was heavier than he thoughts, felt a pull in the left lateral chest wall.  Patient states he has been having worsening pain in that area since, pain worse with movement or taking a deep breath.  Patient denies feeling short of breath.  No cough or lightheadedness.      History provided by:  Patient   used: No        Prior to Admission Medications   Prescriptions Last Dose Informant Patient Reported? Taking?   Eliquis 5 MG   Yes No   Sig: Take 5 mg by mouth 2 (two) times a day   Entresto 24-26 MG TABS   Yes No   Sig: Take 1 tablet by mouth 2 (two) times a day   Na Sulfate-K Sulfate-Mg Sulf (Suprep Bowel Prep Kit) 17.5-3.13-1.6 GM/177ML SOLN   No No   Sig: Take 2 Bottles by mouth see administration instructions Please follow the instructions from the office   Patient not taking: Reported on 12/15/2021   SACUBITRIL-VALSARTAN PO   Yes No   Sig: Take by mouth   Patient not taking: Reported on 11/17/2023   amLODIPine (NORVASC) 2.5 mg tablet   No No   Sig: Take 1 tablet (2.5 mg total) by mouth daily   Patient not taking: Reported on 11/17/2023   amLODIPine (NORVASC) 5 mg tablet   No No   Sig: Take 1 tablet (5 mg total) by mouth daily   Patient not taking: Reported on 11/17/2023   amiodarone 200 mg tablet   Yes No   Sig: Take 200 mg by mouth daily   apixaban (ELIQUIS) 2.5 mg   Yes No   Sig: Take 2.5 mg by mouth 2 (two) times a day   atorvastatin (LIPITOR) 80 mg tablet   No No   Sig: Take 1 tablet (80 mg total) by mouth daily with dinner   carvedilol (COREG) 12.5 mg tablet   Yes No   Sig: Take 6.25 mg  by mouth 2 (two) times a day   finasteride (PROSCAR) 5 mg tablet   Yes No   Sig: Take 5 mg by mouth daily   tamsulosin (FLOMAX) 0.4 mg   Yes No   Sig: Take 0.4 mg by mouth daily      Facility-Administered Medications: None       Past Medical History:   Diagnosis Date    Colon polyp     High cholesterol     Hypertension        Past Surgical History:   Procedure Laterality Date    COLONOSCOPY      HERNIA REPAIR      THYROIDECTOMY Left        Family History   Problem Relation Age of Onset    Lymphoma Son         Non-Hodgkin's lymphoma     Alzheimer's disease Mother     Dementia Paternal Uncle      I have reviewed and agree with the history as documented.    E-Cigarette/Vaping    E-Cigarette Use Never User      E-Cigarette/Vaping Substances    Nicotine No     THC No     CBD No     Flavoring No     Other No     Unknown No      Social History     Tobacco Use    Smoking status: Never    Smokeless tobacco: Never   Vaping Use    Vaping status: Never Used   Substance Use Topics    Alcohol use: Yes     Comment: occ    Drug use: Never       Review of Systems   Constitutional: Negative.    Respiratory:  Negative for cough and shortness of breath.    Neurological: Negative.        Physical Exam  Physical Exam  Vitals and nursing note reviewed.   Constitutional:       General: He is not in acute distress.  HENT:      Head: Normocephalic and atraumatic.   Cardiovascular:      Rate and Rhythm: Regular rhythm. Bradycardia present.   Pulmonary:      Effort: Pulmonary effort is normal.      Breath sounds: Normal breath sounds.   Chest:      Comments: Mild tenderness to left lateral chest wall, no crepitus, no swelling or deformity.  Abdominal:      Palpations: Abdomen is soft.      Tenderness: There is no abdominal tenderness.   Musculoskeletal:         General: Normal range of motion.   Skin:     General: Skin is warm and dry.      Findings: No bruising.   Neurological:      General: No focal deficit present.      Mental Status: He is  alert and oriented to person, place, and time.      Motor: No weakness.      Gait: Gait normal.         Vital Signs  ED Triage Vitals   Temperature Pulse Respirations Blood Pressure SpO2   09/08/24 1430 09/08/24 1430 09/08/24 1430 09/08/24 1430 09/08/24 1430   97.8 °F (36.6 °C) 55 16 (!) 182/79 99 %      Temp Source Heart Rate Source Patient Position - Orthostatic VS BP Location FiO2 (%)   09/08/24 1430 09/08/24 1530 09/08/24 1430 09/08/24 1430 --   Tympanic Monitor Sitting Right arm       Pain Score       09/08/24 1430       9           Vitals:    09/08/24 1430 09/08/24 1530   BP: (!) 182/79 138/63   Pulse: 55 (!) 45   Patient Position - Orthostatic VS: Sitting Sitting         Visual Acuity      ED Medications  Medications - No data to display    Diagnostic Studies  Results Reviewed       None                   XR chest 2 views    (Results Pending)              Procedures  ECG 12 Lead Documentation Only    Date/Time: 9/8/2024 3:24 PM    Performed by: Deangelo Moulton MD  Authorized by: Deangelo Moulton MD    ECG reviewed by me, the ED Provider: yes    Patient location:  ED  Rate:     ECG rate assessment: bradycardic    Rhythm:     Rhythm: sinus bradycardia    Ectopy:     Ectopy: none    QRS:     QRS axis:  Normal    QRS intervals:  Normal  Conduction:     Conduction: normal    Comments:      Sinus bradycardia at 46, no acute changes           ED Course  ED Course as of 09/08/24 1533   Sun Sep 08, 2024   1512 Chest x-ray independently reviewed myself, no pneumothorax or effusion, no acute findings.   1524 Previous medical records reviewed, patient had Holter monitor in 2023 showing average heart rate in 40s.   1532 Patient comfortable, able to stand and ambulate without difficulty, normal respiratory effort.  Patient with history and exam consistent with muscle strain and chest wall.  Patient states he has been using anti-inflammatories, requesting muscle relaxer which she has taken in the past without difficulty.   Prescription sent to pharmacy, instructed patient to apply heat and ice to area, follow-up with primary care doctor for repeat evaluation, return precautions given.                                 SBIRT 22yo+      Flowsheet Row Most Recent Value   Initial Alcohol Screen: US AUDIT-C     1. How often do you have a drink containing alcohol? 0 Filed at: 09/08/2024 1451   2. How many drinks containing alcohol do you have on a typical day you are drinking?  0 Filed at: 09/08/2024 1451   3a. Male UNDER 65: How often do you have five or more drinks on one occasion? 0 Filed at: 09/08/2024 1451   3b. FEMALE Any Age, or MALE 65+: How often do you have 4 or more drinks on one occassion? 0 Filed at: 09/08/2024 1451   Audit-C Score 0 Filed at: 09/08/2024 1451   KAYLEE: How many times in the past year have you...    Used an illegal drug or used a prescription medication for non-medical reasons? Never Filed at: 09/08/2024 1451                      Medical Decision Making  79-year-old male, presents with pain in left lateral chest.  Differential diagnosis includes muscle strain, pneumothorax, ACS among other diagnoses.  Patient states he pulled muscle when trying to lift heavy branch several days ago, has had continued pain.  Patient looks well in no distress, normal respiratory effort, ambulate without difficulty.  EKG and chest x-ray ordered.    Amount and/or Complexity of Data Reviewed  Radiology: ordered and independent interpretation performed. Decision-making details documented in ED Course.  ECG/medicine tests: ordered and independent interpretation performed. Decision-making details documented in ED Course.    Risk  Prescription drug management.                 Disposition  Final diagnoses:   Muscle strain of chest wall, initial encounter     Time reflects when diagnosis was documented in both MDM as applicable and the Disposition within this note       Time User Action Codes Description Comment    9/8/2024  3:28 PM Saige  Deangelo Chappell [S29.011A] Muscle strain of chest wall, initial encounter           ED Disposition       ED Disposition   Discharge    Condition   Stable    Date/Time   Sun Sep 8, 2024 1528    Comment   Bam Giraldo discharge to home/self care.                   Follow-up Information       Follow up With Specialties Details Why Contact Sade Bradshaw DO Family Medicine   [address]  [facility] [address]  [phone]              Discharge Medication List as of 9/8/2024  3:28 PM        START taking these medications    Details   cyclobenzaprine (FLEXERIL) 10 mg tablet Take 1 tablet (10 mg total) by mouth 2 (two) times a day as needed for muscle spasms, Starting Sun 9/8/2024, Normal           CONTINUE these medications which have NOT CHANGED    Details   amiodarone 200 mg tablet Take 200 mg by mouth daily, Starting Fri 7/7/2023, Historical Med      !! amLODIPine (NORVASC) 2.5 mg tablet Take 1 tablet (2.5 mg total) by mouth daily, Starting Fri 10/27/2023, Normal      !! amLODIPine (NORVASC) 5 mg tablet Take 1 tablet (5 mg total) by mouth daily, Starting Wed 4/27/2022, Normal      !! apixaban (ELIQUIS) 2.5 mg Take 2.5 mg by mouth 2 (two) times a day, Historical Med      atorvastatin (LIPITOR) 80 mg tablet Take 1 tablet (80 mg total) by mouth daily with dinner, Starting Mon 3/25/2024, Normal      carvedilol (COREG) 12.5 mg tablet Take 6.25 mg by mouth 2 (two) times a day, Starting Thu 6/9/2022, Historical Med      !! Eliquis 5 MG Take 5 mg by mouth 2 (two) times a day, Starting Fri 3/24/2023, Historical Med      !! Entresto 24-26 MG TABS Take 1 tablet by mouth 2 (two) times a day, Starting Mon 4/3/2023, Historical Med      finasteride (PROSCAR) 5 mg tablet Take 5 mg by mouth daily, Historical Med      Na Sulfate-K Sulfate-Mg Sulf (Suprep Bowel Prep Kit) 17.5-3.13-1.6 GM/177ML SOLN Take 2 Bottles by mouth see administration instructions Please follow the instructions from the office, Starting Thu  9/2/2021, Normal      !! SACUBITRIL-VALSARTAN PO Take by mouth, Historical Med      tamsulosin (FLOMAX) 0.4 mg Take 0.4 mg by mouth daily, Historical Med       !! - Potential duplicate medications found. Please discuss with provider.          No discharge procedures on file.    PDMP Review       None            ED Provider  Electronically Signed by             Deangelo Moulton MD  09/08/24 [id number]

## 2024-09-09 LAB
ATRIAL RATE: 46 BPM
P AXIS: 86 DEGREES
PR INTERVAL: 204 MS
QRS AXIS: -11 DEGREES
QRSD INTERVAL: 100 MS
QT INTERVAL: 466 MS
QTC INTERVAL: 407 MS
T WAVE AXIS: 59 DEGREES
VENTRICULAR RATE: 46 BPM

## 2024-09-09 PROCEDURE — 93010 ELECTROCARDIOGRAM REPORT: CPT | Performed by: INTERNAL MEDICINE

## 2024-09-10 ENCOUNTER — TELEPHONE (OUTPATIENT)
Age: 79
End: 2024-09-10

## 2024-09-10 NOTE — TELEPHONE ENCOUNTER
Attempted Contacting Patient regarding recent ED Visit on 9/8/24.    Left message asking Patient to call the office to schedule Follow up appointment. Provided office hours and phone number.     ED:Deshaun  CC:Cough  DX:Muscle strain of chest wall  Time:2:24pm  Last OV: 9/6/24

## 2024-09-26 DIAGNOSIS — E78.49 OTHER HYPERLIPIDEMIA: ICD-10-CM

## 2024-09-26 RX ORDER — ATORVASTATIN CALCIUM 80 MG/1
80 TABLET, FILM COATED ORAL
Qty: 30 TABLET | Refills: 5 | Status: SHIPPED | OUTPATIENT
Start: 2024-09-26

## 2024-12-16 DIAGNOSIS — S29.011A MUSCLE STRAIN OF CHEST WALL, INITIAL ENCOUNTER: ICD-10-CM

## 2024-12-16 RX ORDER — CYCLOBENZAPRINE HCL 10 MG
10 TABLET ORAL 2 TIMES DAILY PRN
Qty: 20 TABLET | Refills: 0 | Status: SHIPPED | OUTPATIENT
Start: 2024-12-16 | End: 2024-12-18

## 2024-12-16 NOTE — TELEPHONE ENCOUNTER
Patient called to request the selected rx and adv he is in need of them again. I adv I will route message and if pcp has any questions patients # is [phone].   Please Review, Thank you

## 2024-12-18 DIAGNOSIS — S29.011A MUSCLE STRAIN OF CHEST WALL, INITIAL ENCOUNTER: ICD-10-CM

## 2024-12-18 RX ORDER — CYCLOBENZAPRINE HCL 10 MG
TABLET ORAL
Qty: 20 TABLET | Refills: 0 | Status: SHIPPED | OUTPATIENT
Start: 2024-12-18

## 2024-12-20 ENCOUNTER — OFFICE VISIT (OUTPATIENT)
Age: 79
End: 2024-12-20

## 2024-12-20 VITALS
HEIGHT: 69 IN | WEIGHT: 182 LBS | DIASTOLIC BLOOD PRESSURE: 60 MMHG | BODY MASS INDEX: 26.96 KG/M2 | SYSTOLIC BLOOD PRESSURE: 120 MMHG

## 2024-12-20 DIAGNOSIS — E78.00 PURE HYPERCHOLESTEROLEMIA: ICD-10-CM

## 2024-12-20 DIAGNOSIS — Z28.21 REFUSED INFLUENZA VACCINE: ICD-10-CM

## 2024-12-20 DIAGNOSIS — M17.12 PRIMARY OSTEOARTHRITIS OF LEFT KNEE: ICD-10-CM

## 2024-12-20 DIAGNOSIS — I10 PRIMARY HYPERTENSION: ICD-10-CM

## 2024-12-20 DIAGNOSIS — Z00.00 MEDICARE ANNUAL WELLNESS VISIT, SUBSEQUENT: Primary | ICD-10-CM

## 2024-12-20 DIAGNOSIS — M17.11 PRIMARY OSTEOARTHRITIS OF RIGHT KNEE: ICD-10-CM

## 2024-12-20 PROCEDURE — G0439 PPPS, SUBSEQ VISIT: HCPCS | Performed by: FAMILY MEDICINE

## 2024-12-20 NOTE — ASSESSMENT & PLAN NOTE
The patient's blood pressure is well-controlled with his medications.  He does not need refills.

## 2024-12-20 NOTE — ASSESSMENT & PLAN NOTE
The patient has severe tricompartment osteoarthritis of his left knee.  He is referred back to his orthopedic doctor for further treatment.

## 2024-12-20 NOTE — PROGRESS NOTES
Name: Bam Giraldo      : 1945      MRN: [id number]  Encounter Provider: Martinez Bradshaw DO  Encounter Date: 2024   Encounter department: [facility]  :  Assessment & Plan  Medicare annual wellness visit, subsequent  The patient denies any problems with falls recently.  He denies any depression.  He denies urinary incontinence.  He does need to provide this office with an advance directive copy.  He refuses any vaccinations today.  Yearly Medicare wellness exam recommended.       Pure hypercholesterolemia  The patient has a history of hypercholesterolemia.  He is on statin and is due for a lipid panel.  Orders:    Lipid panel; Future    Primary osteoarthritis of right knee  The patient has severe tricompartment osteoarthritis of his right knee.  He should refer back to his orthopedic doctor for any further treatment.       Primary osteoarthritis of left knee  The patient has severe tricompartment osteoarthritis of his left knee.  He is referred back to his orthopedic doctor for further treatment.       Refused influenza vaccine  Patient refused flu vaccine today.       Primary hypertension  The patient's blood pressure is well-controlled with his medications.  He does not need refills.              History of Present Illness     This is a Medicare wellness note for this 79-year-old male.  He states a problem with clearing his throat for several months.  He is unsure if it is related to an infection possibly COVID.  He denies any problems swallowing.  He denies any weight loss.  He notices the problem most when he first wakes up in the morning.  He denies any pain in his throat.  He denies any recent falls other than when his blood pressure was low several months ago.  He denies any problems with depression or urinary incontinence.  He does need to provide us office with a advance directive.  He does complain of bilateral knee stiffness especially when waking up in  "the morning.  He did have knee x-rays done approximately 14 months ago which showed severe tricompartment arthritis.  I did advise the patient to discuss the knee problem with his orthopedic doctor.  Does have a history of paroxysmal atrial fibrillation.  He is on Eliquis because of this diagnosis.  He is on a statin.  He is overdue for a lipid panel.  He is refusing any vaccinations today.      Review of Systems   Constitutional: Negative.    Respiratory: Negative.     Cardiovascular: Negative.    Neurological: Negative.    Psychiatric/Behavioral: Negative.         Objective   /60   Ht 5' 9\" (1.753 m)   Wt 82.6 kg (182 lb)   BMI 26.88 kg/m²      Physical Exam  Constitutional:       Comments: The patient is mildly overweight with a BMI of 26.88   HENT:      Mouth/Throat:      Mouth: Mucous membranes are moist.      Pharynx: Oropharynx is clear.   Cardiovascular:      Rate and Rhythm: Normal rate and regular rhythm.      Heart sounds: Normal heart sounds.   Pulmonary:      Effort: Pulmonary effort is normal.      Breath sounds: Normal breath sounds.   Musculoskeletal:         General: Normal range of motion.   Neurological:      General: No focal deficit present.      Mental Status: He is alert and oriented to person, place, and time. Mental status is at baseline.   Psychiatric:         Mood and Affect: Mood normal.         Behavior: Behavior normal.         Thought Content: Thought content normal.         Judgment: Judgment normal.         "

## 2024-12-20 NOTE — ASSESSMENT & PLAN NOTE
The patient has a history of hypercholesterolemia.  He is on statin and is due for a lipid panel.  Orders:    Lipid panel; Future

## 2024-12-20 NOTE — ASSESSMENT & PLAN NOTE
The patient has severe tricompartment osteoarthritis of his right knee.  He should refer back to his orthopedic doctor for any further treatment.

## 2024-12-27 LAB
CHOLEST SERPL-MCNC: 144 MG/DL (ref 100–199)
HDLC SERPL-MCNC: 57 MG/DL
LDLC SERPL CALC-MCNC: 74 MG/DL (ref 0–99)
SL AMB VLDL CHOLESTEROL CALC: 13 MG/DL (ref 5–40)
TRIGL SERPL-MCNC: 66 MG/DL (ref 0–149)

## 2024-12-28 ENCOUNTER — RESULTS FOLLOW-UP (OUTPATIENT)
Age: 79
End: 2024-12-28

## 2024-12-30 ENCOUNTER — TELEPHONE (OUTPATIENT)
Age: 79
End: 2024-12-30

## 2024-12-30 DIAGNOSIS — S39.011A STRAIN OF ABDOMINAL MUSCLE, INITIAL ENCOUNTER: Primary | ICD-10-CM

## 2024-12-30 RX ORDER — METHOCARBAMOL 500 MG/1
500 TABLET, FILM COATED ORAL 4 TIMES DAILY
Qty: 28 TABLET | Refills: 0 | Status: SHIPPED | OUTPATIENT
Start: 2024-12-30 | End: 2025-01-01

## 2024-12-30 NOTE — TELEPHONE ENCOUNTER
Patient called to request something stronger that the Flexeril 10mg. He is requesting to speak w/ his pcp about medication before 1:30pm. [phone]. Thank you

## 2024-12-31 DIAGNOSIS — S39.011A STRAIN OF ABDOMINAL MUSCLE, INITIAL ENCOUNTER: ICD-10-CM

## 2025-01-01 RX ORDER — METHOCARBAMOL 500 MG/1
500 TABLET, FILM COATED ORAL 4 TIMES DAILY
Qty: 28 TABLET | Refills: 0 | Status: SHIPPED | OUTPATIENT
Start: 2025-01-01

## 2025-03-14 ENCOUNTER — TELEPHONE (OUTPATIENT)
Age: 80
End: 2025-03-14

## 2025-03-14 DIAGNOSIS — M62.838 NIGHT MUSCLE SPASMS: Primary | ICD-10-CM

## 2025-03-14 RX ORDER — CYCLOBENZAPRINE HCL 10 MG
10 TABLET ORAL
Qty: 7 TABLET | Refills: 0 | Status: SHIPPED | OUTPATIENT
Start: 2025-03-14

## 2025-03-14 NOTE — TELEPHONE ENCOUNTER
Patient called stating that he is still experiencing muscle spasms. I suggested an office visit, he declined stating that he would rather discuss next steps with you on the phone.    Please advise.    Patient can be contacted back at:  [phone]

## 2025-03-26 DIAGNOSIS — E78.49 OTHER HYPERLIPIDEMIA: ICD-10-CM

## 2025-03-26 RX ORDER — ATORVASTATIN CALCIUM 80 MG/1
80 TABLET, FILM COATED ORAL
Qty: 30 TABLET | Refills: 5 | Status: SHIPPED | OUTPATIENT
Start: 2025-03-26